# Patient Record
Sex: FEMALE | Race: WHITE
[De-identification: names, ages, dates, MRNs, and addresses within clinical notes are randomized per-mention and may not be internally consistent; named-entity substitution may affect disease eponyms.]

---

## 2023-01-06 ENCOUNTER — HOSPITAL ENCOUNTER (EMERGENCY)
Dept: HOSPITAL 97 - ER | Age: 43
Discharge: HOME | End: 2023-01-06
Payer: COMMERCIAL

## 2023-01-06 VITALS — TEMPERATURE: 97.8 F | DIASTOLIC BLOOD PRESSURE: 82 MMHG | OXYGEN SATURATION: 98 % | SYSTOLIC BLOOD PRESSURE: 132 MMHG

## 2023-01-06 DIAGNOSIS — Z88.0: ICD-10-CM

## 2023-01-06 DIAGNOSIS — F43.22: Primary | ICD-10-CM

## 2023-01-06 DIAGNOSIS — D64.9: ICD-10-CM

## 2023-01-06 DIAGNOSIS — I10: ICD-10-CM

## 2023-01-06 DIAGNOSIS — Z88.5: ICD-10-CM

## 2023-01-06 DIAGNOSIS — F17.210: ICD-10-CM

## 2023-01-06 LAB
BUN BLD-MCNC: 8 MG/DL (ref 7–18)
GLUCOSE SERPLBLD-MCNC: 88 MG/DL (ref 74–106)
HCT VFR BLD CALC: 35 % (ref 36–45)
LYMPHOCYTES # SPEC AUTO: 3.6 K/UL (ref 0.7–4.9)
MCV RBC: 73 FL (ref 80–100)
PMV BLD: 8.8 FL (ref 7.6–11.3)
POTASSIUM SERPL-SCNC: 4.3 MMOL/L (ref 3.5–5.1)
RBC # BLD: 4.79 M/UL (ref 3.86–4.86)
TROPONIN I SERPL HS-MCNC: 3.3 PG/ML (ref ?–58.9)

## 2023-01-06 PROCEDURE — 99285 EMERGENCY DEPT VISIT HI MDM: CPT

## 2023-01-06 PROCEDURE — 85025 COMPLETE CBC W/AUTO DIFF WBC: CPT

## 2023-01-06 PROCEDURE — 80048 BASIC METABOLIC PNL TOTAL CA: CPT

## 2023-01-06 PROCEDURE — 71045 X-RAY EXAM CHEST 1 VIEW: CPT

## 2023-01-06 PROCEDURE — 84484 ASSAY OF TROPONIN QUANT: CPT

## 2023-01-06 PROCEDURE — 93005 ELECTROCARDIOGRAM TRACING: CPT

## 2023-01-06 PROCEDURE — 36415 COLL VENOUS BLD VENIPUNCTURE: CPT

## 2023-01-06 NOTE — EDPHYS
Physician Documentation                                                                           

 [facility]                                                                 

Name: Shemar Dominguez                                                                              

Age: 42 yrs                                                                                       

Sex: Female                                                                                       

: 1980                                                                                   

MRN: [id number]                                                                                   

Arrival Date: 2023                                                                          

Time: 13:52                                                                                       

Account#: [id number]                                                                            

Bed 16                                                                                            

Private MD:                                                                                       

ED Physician David Thomas                                                                         

HPI:                                                                                              

                                                                                             

14:59 This 42 yrs old Female presents to ER via Ambulatory with complaints of Chest Pain, Arm ms3 

      Pain, Weakness.                                                                             

14:59 42-year-old female with past medical history of hypertension, hypercholesterolemia,     ms3 

      anxiety presents for chest pain and left arm pain that began at 2 AM. Patient states        

      she is going through divorce at this time and is under significant stress. Patient          

      states the discomfort is severe and described as being tight. Patient denies                

      alleviating or inciting factors. Patient was instructed to take a Klonopin at 3 AM.         

      Patient endorses nausea. Patient denies vomiting or shortness of breath.                    

                                                                                                  

OB/GYN:                                                                                           

14:23 LMP 2022                                                                          ap3 

                                                                                                  

Historical:                                                                                       

- Allergies:                                                                                      

14:20 Codeine;                                                                                ap3 

14:20 PENICILLINS;                                                                            ap3 

- PMHx:                                                                                           

14:20 Hypertensive disorder; Hypercholesterolemia; Anxiety;                                   ap3 

                                                                                                  

- Immunization history:: Client reports receiving the 2nd dose of the Covid vaccine,              

  Flu vaccine is not up to date.                                                                  

- Social history:: Smoking status: Patient reports the use of cigarette tobacco                   

  products, smokes more than three packs cigarettes per day.                                      

                                                                                                  

                                                                                                  

ROS:                                                                                              

14:59 Constitutional: Negative for fever, and chills. Neck: Negative for injury, pain, and    ms3 

      swelling, Respiratory: Negative for shortness of breath, cough, wheezing, and pleuritic     

      chest pain.                                                                                 

14:59 Skin: Negative for injury, rash, and discoloration.                                         

14:59 Cardiovascular: Positive for chest pain.                                                    

14:59 Abdomen/GI: Positive for nausea, Negative for abdominal pain, vomiting.                     

                                                                                                  

Exam:                                                                                             

14:33 ECG was reviewed by the Attending Physician.                                            ms3 

14:59 Constitutional:  This is a well developed, well nourished patient who is awake, alert,  ms3 

      and in no acute distress. Head/Face:  Normocephalic, atraumatic. Chest/axilla:  Normal      

      chest wall appearance and motion.  Nontender with no deformity.   Cardiovascular:           

      Regular rate and rhythm with a normal S1 and S2.  No gallops, murmurs, or rubs.  Normal     

      PMI, no JVD.  No pulse deficits. Respiratory:  Lungs have equal breath sounds               

      bilaterally, clear to auscultation and percussion.  No rales, rhonchi or wheezes noted.     

       No increased work of breathing, no retractions or nasal flaring. Abdomen/GI:  Soft,        

      non-tender, with normal bowel sounds.  No distension or tympany.  No guarding or            

      rebound.  No evidence of tenderness throughout. Skin:  Warm, dry with normal turgor.        

      Normal color with no rashes, no lesions, and no evidence of cellulitis. MS/ Extremity:      

      Pulses equal, no cyanosis.  Neurovascular intact.  Full, normal range of motion.            

                                                                                                  

Vital Signs:                                                                                      

14:16  / 82; Pulse 71; Resp 17; Temp 97.8; Pulse Ox 98% ; Weight 99.79 kg; Height 5 ft. ap3 

      7 in. (170.18 cm);                                                                          

14:16 Body Mass Index 34.46 (99.79 kg, 170.18 cm)                                             ap3 

                                                                                                  

MDM:                                                                                              

14:58 Patient medically screened.                                                             ms3 

14:59 Differential diagnosis: abnormal EKG, acute myocardial infarction, chest wall pain,     ms3 

      pulmonary embolus. ED course: PERC negative therefore ruling out pulmonary embolism.        

16:25 HEART Score: History: Slightly Suspicious (0), ECG: Normal (0), Age: < or = 45 years    ms3 

      (0), Risk Factors: 1 or 2 risk factors (1), Troponin: < or = 1 x Normal Limit (0),          

      Total Score = 1. Data reviewed: vital signs, nurses notes, lab test result(s), EKG,         

      radiologic studies, and as a result, I will discharge patient. Counseling: I had a          

      detailed discussion with the patient and/or guardian regarding: the historical points,      

      exam findings, and any diagnostic results supporting the discharge/admit diagnosis, lab     

      results, radiology results, the need for outpatient follow up, to return to the             

      emergency department if symptoms worsen or persist or if there are any questions or         

      concerns that arise at home. Special discussion: Based on the patient's history, exam,      

      and Dx evaluation, there is no indication for emergent intervention or inpatient Tx. It     

      is understood by the patient/guardian that if the Sx's persist or worsen they need to       

      return immediately for re-evaluation. I discussed with the patient/guardian in detail       

      that at this point there is no indication for admission to the hospital. It is              

      understood, however, that if the symptoms persist or worsen the patient needs to return     

      immediately for re-evaluation. ED course: Consideration of hospitalization, however         

      patient's heart score 1. Independent interpretations: Cardiac monitor normal sinus          

      rhythm, heart rate 71 Prescriptions considered but not given: Discussed                     

      over-the-counter Tylenol and ibuprofen History obtained from patient and her                

      significant other Chronic illnesses impacting care: Hypertension and                        

      hypercholesterolemia influenza heart score. .                                               

                                                                                                  

                                                                                             

14:11 Order name: Basic Metabolic Panel; Complete Time: 15:04                                 ms3 

                                                                                             

14:11 Order name: CBC with Diff; Complete Time: 15:04                                         ms3 

                                                                                             

14:11 Order name: Troponin HS; Complete Time: 15:04                                           ms3 

                                                                                             

14:11 Order name: XRAY Chest (1 view); Complete Time: 15:04                                   ms3 

                                                                                             

14:11 Order name: EKG; Complete Time: 14:12                                                   ms3 

                                                                                             

14:11 Order name: Cardiac monitoring; Complete Time: 14:32                                    ms3 

                                                                                             

14:11 Order name: EKG - Nurse/Tech; Complete Time: 14:32                                      ms3 

                                                                                             

14:11 Order name: IV Saline Lock; Complete Time: 14:32                                        ms3 

                                                                                             

14:11 Order name: Labs collected and sent; Complete Time: 14:32                               ms3 

                                                                                             

14:11 Order name: O2 Per Protocol; Complete Time: 14:25                                       ms3 

                                                                                             

14:11 Order name: O2 Sat Monitoring; Complete Time: 14:25                                     ms3 

                                                                                                  

EC:33 Rate is 66 beats/min. Rhythm is regular. QRS Axis is Normal. DE interval is normal. QRS ms3 

      interval is normal. Clinical impression: Normal ECG. Interpreted by me. Reviewed by me.     

                                                                                                  

Administered Medications:                                                                         

No medications were administered                                                                  

                                                                                                  

                                                                                                  

Disposition Summary:                                                                              

23 15:17                                                                                    

Discharge Ordered                                                                                 

      Location: Home                                                                          ms3 

      Condition: Stable                                                                       ms3 

      Diagnosis                                                                                   

        - Chest pain, unspecified                                                             ms3 

        - Adjustment disorder with anxiety                                                    ms3 

        - Anemia, unspecified                                                                 ms3 

      Followup:                                                                               ms3 

        - With: Seymour Romero DO                                                                  

        - When: 1 - 2 days                                                                         

        - Reason: Recheck today's complaints                                                       

      Discharge Instructions:                                                                     

        - Discharge Summary Sheet                                                             ms3 

        - Anemia                                                                              ms3 

        - Nonspecific Chest Pain, Adult                                                       ms3 

      Forms:                                                                                      

        - Medication Reconciliation Form                                                      ms3 

        - Thank You Letter                                                                    ms3 

        - Antibiotic Education                                                                ms3 

        - Prescription Opioid Use                                                             ms3 

Signatures:                                                                                       

Dispatcher [employer]                           Deysi Broussard RN                    RN   ap3                                                  

David Thomas DO DO   ms3                                                  

                                                                                                  

Corrections: (The following items were deleted from the chart)                                    

15: 14:59 Differential diagnosis: abnormal EKG, acute myocardial infarction, chest wall     ms3 

      pain, pulmonary embolus, ms3                                                                

15:03 14:59 ED course: PERC negative.. ms3                                                    ms3 

                                                                                                  

**************************************************************************************************

## 2023-01-06 NOTE — ER
Nurse's Notes                                                                                     

 [facility]                                                                 

Name: Shemar Dominguez                                                                              

Age: 42 yrs                                                                                       

Sex: Female                                                                                       

: 1980                                                                                   

MRN: [id number]                                                                                   

Arrival Date: 2023                                                                          

Time: 13:52                                                                                       

Account#: [id number]                                                                            

Bed 16                                                                                            

Private MD:                                                                                       

Diagnosis: Chest pain, unspecified;Adjustment disorder with anxiety;Anemia, unspecified           

                                                                                                  

Presentation:                                                                                     

                                                                                             

14:16 Chief complaint: Patient states: she started having chest pain yesterday and she called ap3 

      her dr friend and he told her to take her friends Klonopin and that she could be having     

      an MI or an anxiety attack. patient states she took the extra Klonopin and went to          

      sleep. Patient is worried she had an MI as her chest pain radiated into her left arm.       

      Patient also complains of a headache for the last 6 days and she has not gotten any         

      relief. Coronavirus screen: At this time, the client does not indicate any symptoms         

      associated with coronavirus-19. Ebola Screen: No symptoms or risks identified at this       

      time. Initial Sepsis Screen: Does the patient meet any 2 criteria? No. Patient's            

      initial sepsis screen is negative. Does the patient have a suspected source of              

      infection? No. Patient's initial sepsis screen is negative. Risk Assessment: Do you         

      want to hurt yourself or someone else? Patient reports no desire to harm self or            

      others. Onset of symptoms was 2023.                                             

14:16 Method Of Arrival: Ambulatory                                                           ap3 

14:16 Acuity: MARIA L 3                                                                           ap3 

                                                                                                  

Triage Assessment:                                                                                

14:22 General: Appears in no apparent distress. Behavior is calm. Pain: Complains of pain in  ap3 

      chest. Neuro: Level of Consciousness is awake, alert, obeys commands, Oriented to           

      person, place, time, situation. Cardiovascular: Reports chest pain. Respiratory: Airway     

      is patent Respiratory effort is even, unlabored, Respiratory pattern is regular,            

      symmetrical.                                                                                

                                                                                                  

OB/GYN:                                                                                           

14:23 LMP 2022                                                                          ap3 

                                                                                                  

Historical:                                                                                       

- Allergies:                                                                                      

14:20 Codeine;                                                                                ap3 

14:20 PENICILLINS;                                                                            ap3 

- PMHx:                                                                                           

14:20 Hypertensive disorder; Hypercholesterolemia; Anxiety;                                   ap3 

                                                                                                  

- Immunization history:: Client reports receiving the 2nd dose of the Covid vaccine,              

  Flu vaccine is not up to date.                                                                  

- Social history:: Smoking status: Patient reports the use of cigarette tobacco                   

  products, smokes more than three packs cigarettes per day.                                      

                                                                                                  

                                                                                                  

Screenin:23 [facility] ED Fall Risk Assessment (Adult) History of falling in the last 3 months,       ap3 

      including since admission No falls in past 3 months (0 pts). Abuse screen: Denies           

      threats or abuse. Nutritional screening: No deficits noted. Tuberculosis screening: No      

      symptoms or risk factors identified.                                                        

                                                                                                  

Assessment:                                                                                       

14:24 Pain: Pain radiates to left arm Pain began gradually, 1 day ago.                        ap3 

                                                                                                  

Vital Signs:                                                                                      

14:16  / 82; Pulse 71; Resp 17; Temp 97.8; Pulse Ox 98% ; Weight 99.79 kg; Height 5 ft. ap3 

      7 in. (170.18 cm);                                                                          

14:16 Body Mass Index 34.46 (99.79 kg, 170.18 cm)                                             ap3 

                                                                                                  

ED Course:                                                                                        

13:52 Patient arrived in ED.                                                                  as  

14:11 David Thomas DO is Attending Physician.                                                ms3 

14:12 Akhil Archer, RN is Primary Nurse.                                                    bp  

14:20 Triage completed.                                                                       ap3 

14:23 Arm band placed on right wrist.                                                         ap3 

14:23 Patient maintains SpO2 saturation greater than 95% on room air.                         ap3 

14:24 Patient has correct armband on for positive identification. Placed in gown. Call light  ap3 

      in reach. Side rails up X 1. Adult w/ patient. Cardiac monitor on. Pulse ox on. NIBP        

      on. Door closed. Noise minimized.                                                           

14:30 Inserted saline lock: 20 gauge in left forearm, using aseptic technique. Blood          bp  

      collected.                                                                                  

14:39 EKG done, by ED staff.                                                                  tm3 

14:54 XRAY Chest (1 view) In Process Unspecified.                                             EDMS

15:16 Seymour Romero DO is Referral Physician.                                                ms3 

15:31 No provider procedures requiring assistance completed. IV discontinued, intact,         ap3 

      bleeding controlled, No redness/swelling at site. Pressure dressing applied.                

                                                                                                  

Administered Medications:                                                                         

No medications were administered                                                                  

                                                                                                  

                                                                                                  

Outcome:                                                                                          

15:17 Discharge ordered by MD.                                                                ms3 

15:31 Discharged to home ambulatory.                                                          ap3 

15:31 Condition: good                                                                             

15:31 Discharge instructions given to patient, Instructed on discharge instructions, follow       

      up and referral plans. Demonstrated understanding of instructions, follow-up care.          

15:32 Patient left the ED.                                                                    ap3 

                                                                                                  

Signatures:                                                                                       

Dispatcher [employer]                           EDMS                                                 

Willi Mora                                tm3                                                  

Kaylyn Meyer Brian, RN                      RN   bp                                                   

Deysi Pressley RN                    RN   ap3                                                  

David Thomas DO DO   ms3                                                  

                                                                                                  

**************************************************************************************************

## 2023-01-06 NOTE — RAD REPORT
EXAM DESCRIPTION:  RAD - Chest Single View - 1/6/2023 2:53 pm

 

CLINICAL HISTORY:  CHEST PAIN

 

TECHNIQUE:  AP portable chest image was obtained 1/6/2023 2:53 pm .

 

FINDINGS:  No suspicious infiltrate or mass. Linear atelectasis seen in the mid right lung field. No 
failure or volume overload. Heart and vasculature are normal. No measurable pleural effusion and no p
neumothorax. No acute bony abnormality seen. No acute aortic findings suspected.

 

IMPRESSION:  No acute cardiopulmonary process.

## 2023-01-06 NOTE — XMS REPORT
Continuity of Care Document

                           Created on:2023



Patient:FAMILIA WILSON

Sex:Female

:1980

External Reference #:[id number]





Demographics







                          Address                   [address] [address]

 

                          Home Phone                [phone]

 

                          Work Phone                [phone]

 

                          Mobile Phone              [phone]

 

                          Email Address             [email or web address]

 

                          Preferred Language        English

 

                          Marital Status            Unknown

 

                          Worship Affiliation     Unknown

 

                          Race                      Unknown

 

                          Additional Race(s)        White

 

                          Ethnic Group              Unknown









Author







                          Organization              [facility]

t

 

                          Address                   [address]



                                                    [place] [address]

 

                          Phone                     [phone]









Support







                Name            Relationship    Address         Phone

 

                ELDA WILSON Spouse          [address] [facility] [phone]



                                                [place] [id number] 

 

                FAMILIA WILSON               .               [phone]



                                                .               

 

                AGUSTIN WILSON SPOU            [address] [facility] [phone]



                                                .               



                                                [place] [id number] 

 

                Steve, NADYA Spouse          [address] [facility] [address] [phone]



                                                [place] [id number] 

 

                JOSE NADYA Unavailable     [address] [place] [phone]



                                                [place] [id number] 

 

                ARGELIAR, ELDA Unavailable     [address] [phone]



                                                [place] [id number] 

 

                ARGELIAR, ELDA SP              [address] [facility] [address] [phone]



                                                [place] [address] 

 

                STEVE, ELDA SP              [address] [facility] [phone]



                                                [address] 



                                                [place] [id number] 

 

                Arsenio Nurmo V               [address] [facility] [phone]



                                                [place], [place] [id number] 

 

                None, Legal Guardian O               Unavailable     Unavailable

 

                Steve, Elda Spouse          [address] [phone]



                                                [place] [phone] 

 

                Delicia Bauer   Mother          Unavailable     [phone]

 

                NADYA WILSON SPOU            [address] [facility] [phone]



                                                [place] [id number] 









Care Team Providers







                    Name                Role                Phone

 

                    Asked, No Pcp       Primary Care Physician Unavailable

 

                    layne         Attending Clinician Unavailable

 

                    Julia Hobbs         Attending Clinician Unavailable

 

                    Berlin         Attending Clinician Unavailable

 

                    Sherwin Nur MD Attending Clinician [phone]

 

                    DELBERT MITCHELL      Attending Clinician Unavailable

 

                    Alta Chan    Attending Clinician Unavailable

 

                    Ammy Duke        Attending Clinician Unavailable

 

                    ESTUARDO JACOBO        Attending Clinician Unavailable

 

                    CHELSEA JOHNSON Attending Clinician Unavailable

 

                    PADILLA PLATA Attending Clinician Unavailable

 

                    Emilee Eubanks         Attending Clinician Unavailable

 

                    [email or web address]        Attending Clinician Unavailable

 

                    KIMBERLY LEWIS Attending Clinician Unavailable

 

                    Vanessa Mg Attending Clinician Unavailable

 

                    RONAK MCMULLEN Attending Clinician Unavailable

 

                    JACQUES HEREDIA Attending Clinician Unavailable

 

                    JOHN GUTIÉRREZ  Attending Clinician Unavailable

 

                    VALERI MEYER Attending Clinician Unavailable

 

                    JUVENAL ANDRADE M.D. Attending Clinician Unavailable

 

                    Edil Martinez   Attending Clinician Unavailable

 

                    HIEN KHAN Attending Clinician Unavailable

 

                    YASIR NOLEN       Attending Clinician Unavailable

 

                    LUIS MANUEL COFFMAN Attending Clinician Unavailable

 

                    GAIL IBARRA Attending Clinician Unavailable

 

                    CLAY NAYLOR       Attending Clinician Unavailable

 

                    TONI BERRY Attending Clinician Unavailable

 

                    WICHO AGRAWAL   Attending Clinician Unavailable

 

                    LISA MARIE Attending Clinician Unavailable

 

                    ROGER SOTOMAYOR Attending Clinician Unavailable

 

                    MIGUEL ANGEL CASTRO   Attending Clinician Unavailable

 

                    JENIFFER BOWEN M.D. Attending Clinician Unavailable

 

                    Physician, No Primary or Family Admitting Clinician UnavailAdi Iraheta  Admitting Clinician Unavailable

 

                    Cassidy_TERRY         Admitting Clinician Unavailable

 

                    Charla Page        Admitting Clinician Unavailable

 

                    [email or web address]        Admitting Clinician Unavailable

 

                    KNOW, DOES_NOT      Admitting Clinician Unavailable

 

                    DANIEL SANCHEZ Admitting Clinician Unavailable

 

                    Alta Chan MD Unavailable         Unavailable

 

                    Yang MSN, FNP, Marylin Unavailable         [phone]

 

                    Sherwin Nur MD Unavailable         [phone]









Payers







           Payer Name Policy Type Policy Number Effective Date Expiration Date S

noe

 

           [employer]            [id number]  2014 



           PLUS                             00:00:00   00:00:00   

 

           [employer] P          [id number]  2022            



                                            00:00:00              

 

           [facility]            [id number] 2022            



           [place]                       00:00:00              



           - DUAL ELIGIBLE                                             



           (MEDICARE                                              



           REPLACEMENT/ADVANT                                             



           AGE - HMO)                                             

 

           [facility]            [id number] 2022            



           COMMUNITY PLAN -                       00:00:00              



           [place]                                             



           (MEDICAID HMO)                                             

 

           [employer]            [id number]  2020            



           HMO                              00:00:00              

 

           MEDICAID              [id number]  [id number]            



           [employer]                       00:00:00              







Problems







       Condition Condition Condition Status Onset  Resolution Last   Treating Co

mments 

Source



       Name   Details Category        Date   Date   Treatment Clinician        



                                                 Date                 

 

       Foot joint        Condition Active 2022 Chan,    

    [facility]



       pain, left                                11:42:36 Alta        Adul

t



                                   00:00:                             Medicin



                                   00                                 e

 

       Other         Condition Active 2022 AYAZ Chan



       fracture                                11:42:36 Alta        Adult



       of left                      00:00:                             Medicin



       foot,                       00                                 e



       initial                                                         



       encounter                                                         



       for closed                                                         



       fracture                                                         

 

       Fatigue        Condition Active 2022 AYAZ Chan



       and                                   11:42:36 Alta        Adult



       malaise                      00:00:                             Medicin



                                   00                                 e

 

       Exercise        Condition Active 2022 Rocio      

  [facility]



       Counseling                                11:42:36 Alta        Adul

t



                                   00:00:                             Medicin



                                   00                                 e

 

       Dietary        Condition Active 2022 Rocio       

 [facility]



       counseling                                11:42:36 Alta        Adul

t



       and                         00:00:                             Medicin



       surveillan                      00                                 e



       ce                                                             

 

       BMI           Condition Active 2022 AYAZ Chan



       37.0-37.9                                11:42:36 Alta        Adult



                                   00:00:                             Medicin



                                   00                                 e

 

       Essential        Condition Active 2022 EMIGDIO Soria



       hypertensi                                09:17:39 Marylin        Adul

t



       on                          00:00:                             Medicin



                                   00                                 e

 

       Disorder,        Condition Active 2022 EMIGDIO Soria



       endocrine                                09:17:39 Marylin        Adult



       NOS                         00:00:                             Medicin



                                   00                                 e

 

       Hyperlipid        Condition Active 2022 EMIGDIO Soria



       emia                                  09:17:38 Marylin        Adult



                                   00:00:                             Medicin



                                   00                                 e

 

       Obesity        Condition Active 2022 AYAZ Soria

C



                                             09:17:38 Marylin        Adult



                                   00:00:                             Medicin



                                   00                                 e

 

       DEPRESSIVE        Condition Active 2022 Leoz-Whit   

     [facility]



       DISORDER,                      6-14          09:11:54 zo,           Adult



       MAJOR,                      00:00:               Sherwin        Medicin



       RECURRENT                      00                                 e



       EPISODE,                                                         



       MODERATE                                                         

 

       SCHIZOAFFE        Condition Active 2022 Leoz-Whit   

     [facility]



       CTIVE                                 09:11:54 zo,           Adult



       DISORDER,                      00:00:               Sherwin        Medi

katie



       DEPRESSIVE                      00                                 e



       TYPE                                                           

 

       Tobacco        Condition Active 2022 Leoz-Whit      

  [facility]



       user                                  09:11:54 zo,           Adult



                                   00:00:               Sherwin        Medicin



                                   00                                 e

 

       Abdominal Abdominal Disease Active                              Met

hodi



       pain   pain                 



                                   00:00:                             Hospita



                                   00                                 l

 

       Nausea Nausea Disease Active                              Methodi



                                                                  st



                                   00:00:                             Hospita



                                   00                                 l

 

       Schizoaffe Schizoaffe Disease Active                              M

ethodi



       ctive  ctive                                               st



       disorder disorder               00:00:                             Hospit

a



                                   00                                 l

 

       History of History of Problem Resolve                                    

[place]



       Chronic Chronic        d                                         Physici



       bronchioli bronchioli                                                  an

s



       tis    tis                                                     

 

       History of History of Problem Resolve                                    

[place]



       Depression Depression        d                                         Ph

ysici



       with   with                                                    ans



       anxiety anxiety                                                  

 

       History of History of Problem Resolve                                    

UT



       gastroesop gastroesop        d                                         Ph

ysici



       hageal hageal                                                  ans



       reflux reflux                                                  



       (GERD) (GERD)                                                  

 

       History of History of Problem Resolve                                    

[place]



       high   high          d                                         Physici



       cholestero cholestero                                                  an

s



       l      l                                                       

 

       History of History of Problem Resolve                                    

UT



       hypertensi hypertensi        d                                         Ph

ysici



       on     on                                                      ans

 

       History of History of Problem Resolve                                    

UT



       Migraines Migraines        d                                         Phys

ici



                                                                      ans

 

       History of History of Problem Resolve                                    

UT



       sleep  sleep         d                                         Physici



       apnea  apnea                                                   ans

 

       History of History of Problem Resolve                                    

UT



       panic  panic         d                                         Physici



       attacks attacks                                                  ans

 

       Hospital Hospital Problem Active                                    UT



       discharge discharge                                                  Phys

ici



       follow-up follow-up                                                  ans







Allergies, Adverse Reactions, Alerts







       Allergy Allergy Status Severity Reaction(s) Onset  Inactive Treating Comm

ents 

Source



       Name   Type                        Date   Date   Clinician        

 

       Penicill DA     Active U      UNKNOWN 2022                      [facility]



       ins                                                        [place]



                                          00:00:                      Health



                                          00                          are



                                                                      [facility]

 

       codeine DA     Active U      UNKNOWN -                      [facility]



                                          1-21                        [place]



                                          00:00:                      [facility]



                                          00                          are



                                                                      [facility]

 

       BACLOFEN Drug   Active High          -                      [facility]



              allergy        Criticali        8-19                        Adult



              (disorde        ty            00:00:                      Medicin



              r)                          00                          e

 

       CODEINE Drug   Active High          -                      [facility]



              allergy        Criticali        8-19                        Adult



              (disorde        ty            00:00:                      Medicin



              r)                          00                          e

 

       PENICILL Drug   Active High          -                      [facility]



       IN     allergy        Criticali        8-19                        Adult



              (disorde        ty            00:00:                      Medicin



              r)                          00                          e

 

       Penicill DA     Active U      UNKNOWN-HAPP                       HC

A



       ins                         ENED AS A                         [place]



                                   CHILD  00:00:                      [facility]



                                                                    [facility]

 

       codeine DA     Active SV     HYPERACTIVE,                       [facility]



                                   SPASTIC,BANG 6-                        Conr

oe



                                   ING HEAD ON 00:00:                      Regio

na



                                   [facility]                           [facility]

 

       Penicill DA     Active U      UNKNOWN-HAPP                       HC

A



       ins                         ENED AS A 3-17                        [place]



                                   CHILD  00:00:                      [facility]



                                                                    [facility]

 

       codeine DA     Active SV     HYPERACTIVE,                       [facility]



                                   SPASTIC,BANG 3-17                        Conr

oe



                                   ING HEAD ON 00:00:                      Regio

na



                                   [facility]                           [facility]

 

       Penicill DA     Active U             -                      [facility]



       ins                                3-17                        [facility]



                                          00:00:                      d



                                          00                          [facility]

 

       codeine DA     Active SV            -0                      [facility]



                                          3-17                        [facility]



                                          00:00:                      d



                                          00                          [facility]

 

       Baclofen Drug   Active        Other (See                Unsure, [facility]



              Intolera               Comments)                  bad    [facility]



              nce                         00:00:               "reaction Medical



                                          00                   "      Center

 

       Codeine Drug   Active        Other (See                hyperacti [facility]

I St



              Intolera               Comments)                  vity   [facility]



              nce                         00:00:                      Medical



                                          00                          Center

 

       Penicill Propensi Active        Other (See                Unknown, 

[facility]



       ins    ty to                Comments)                  child[facility]

s



              adverse                      00:00:               allergy Medical



              reaction                      00                          Center



              s                                                       

 

       Ondanset Drug   Active        Nausea Only                       [facility]

 St



       mary Hcl Intolera                                              [facility]



       (Pf)   nce                         00:00:                      Medical



                                          00                          Center

 

       CODEINE Allergy Active High   Other                        [facility]



                                                                  



                                          00:00:                      



                                          00                          

 

       PENICILL Allergy Active        Other                        [facility]



       INS                                                        



                                          00:00:                      



                                          00                          

 

       BACLOFEN Allergy Active        Other                        [facility]



                                                                  [facility]



                                          00:00:                      Medical



                                          00                          Center

 

       ONDANSET Allergy Active Low    Nausea                       [facility]



       [facility]                                                     



       (PF)                               00:00:                      



                                          00                          

 

       Penicill Propensi Active        Other (See                Unknown, 

[facility]



       ins    ty to                Comments)                  childhood [facility]

s



              adverse                      00:00:               allergy Medical



              reaction                      00                          Center



              s                                                       

 

       Penicill DA     Active U      ITCHING                       [facility]



       ins                                                        [place]



                                          00:00:                      d



                                          00                          Medical



                                                                      Center

 

       codeine DA     Active U      HYPER                        [facility]



                                                                  [place]



                                          00:00:                      d



                                          00                          [facility]



                                                                      Center

 

       [facility]     Active U                                   [facility]



       ins                                                        [place]



                                          00:00:                      d



                                          00                          Medical



                                                                      Center

 

       codeine DA     Active U                                   [facility]



                                                                  [place]



                                          00:00:                      d



                                          00                          Medical



                                                                      Center

 

       Pregabal Propensi Active        Other (See 2019               Very   Me

thodi



       in     ty to                Comments) 0-17                 irritable st



              adverse                      00:00:                      Hospita



              reaction                      00                          l



              s to                                                    



              drug                                                    

 

       Codeine Propensi Active                                     [facility]



              ty to                       8-12                        st



              adverse                      00:00:                      Hospita



              reaction                      00                          l



              s to                                                    



              drug                                                    

 

       [facility] Propensi Active                                     Method

i



       ins    ty to                       8-12                        st



              adverse                      00:00:                      Hospita



              reaction                      00                          l



              s to                                                    



              drug                                                    

 

       [facility]     Active U                                   [facility]



       ins                                                        [place]



                                          00:00:                      d



                                          00                          Medical



                                                                      Center

 

       codeine DA     Active SV                                  [facility]



                                          [address]                        [place]



                                          00:00:                      d



                                          00                          Medical



                                                                      Center

 

       codeine DA     Active SV     HYPERACTIVE,                       [facility]



                                   SPASTIC,BANG [address]                        [facility]

[place]



                                   ING HEAD ON 00:00:                      d



                                   THE [place] 00                          Medic

al



                                   A CHILD                             Center

 

       [facility] DA     Active U      UNKNOWN-HAPP                       [facility]

A



       ins                         ENED AS A [address]                        [place]



                                   CHILD  00:00:                      d



                                          00                          Medical



                                                                      Center

 

       [facility] Allergy Active                                           [facility]



       ins    to drug                                                  [facility]



              (finding                                                  ans



              )                                                       

 

       codeine Allergy Active                                           [facility]



              to drug                                                  [facility]



              (finding                                                  ans



              )                                                       







Social History







           Social Habit Start Date Stop Date  Quantity   Comments   Source

 

           History SDOH Alcohol                                             [facility]



           [facility] Drinks                                             [facility]

 

           History SDOH Alcohol                                             [facility]



           Binge                                                  [facility]

 

           History SDOH Alcohol                                             [facility]



           Comment                                                [facility]

 

           History of tobacco                       Cigarette Smoker            

[facility]



           use                                                    [facility] Center

 

           time of call 2022/2022 1:23            [facility]



                      13:23:36   13:23:36   PM                    Health

 

           PHQ2 Questionairre 2022                       [facility]



           Score      11:15:26   11:15:26                         Health

 

           if the patient is 2022 No                    [facility]



           using/has used a 11:15:26   11:15:26                         Health



           vaping item,                                             



           Current, Former,                                             



           Never Used, Not                                             



           asked                                                  

 

           smoking, advice to 2022 Yes                   [facility]



           quit       11:15:26   11:15:26                         Health

 

           drug [facility]   2022 [facility]             [facility]

ty



                      11:15:26   11:15:26                         Health

 

           alcohol use 2022 Never                 [facility]

ity



                      11:15:26   11:15:26                         Health

 

           tobacco use 2022 Currently             [facility]

it



           (cigarettes, cigar, 11:15:26   11:15:26                         Healt

h



           chew, pipe)                                             

 

           social history 2022 reviewed today            [facility]



           reviewed E&M 11:15:26   11:15:26                         Health

 

           social history E&M 2022  since            [facility]



                      11:15:26   11:15:26   . Been            Health



                                            together since            



                                             and             



                                            in 2016. Close to            



                                            both bio parents.            



                                            Parents are            



                                            . Half            



                                            brother and full            



                                            sister. "somewhat            



                                            close to both".            



                                            Reports having a            



                                            few friends. No            



                                            children. " I            



                                            would like to",            



                                            reports she lost            



                                            twins, they were            



                                            born after 5            



                                            months of             



                                            pregnancy and            



                                            could not             



                                            survive, this was            



                                            in . Born in            



                                            [place]. Lives in            



                                            [place] with            



                                            partner and            



                                            partner's mother.            



                                            Employment            



                                            status: No.            



                                            Occupation title:            



                                            unemployed.            



                                            Highest education            



                                            level: some            



                                            college.              



                                            Unemployed now,            



                                            havent work in            



                                            the past 15            



                                            years. On             



                                            disability $800            



                                            monthly. Some            



                                            collegeSex at            



                                            birth: Female.            

 

           sexual orientation 2022 Don?t know            [facility]



                      11:15:26   11:15:26                         Health

 

           is there any chance 2022 No                    [facility]

[facility]



           that you could be 11:15:26   11:15:26                         Health



           pregnant?                                              

 

           albumin, serum 2022 4.3 g/dL              [facility]

munity



                      00:00:00   00:00:00                         Health

 

           family support 2022 Been together            [facility]



                      16:31:33   16:31:33   since  and            Health



                                             in 2016.            



                                            Close to both bio            



                                            parents. Parents            



                                            are .            



                                            Half brother and            



                                            full sister.            



                                            "somewhat close            



                                            to both". Reports            



                                            having a few            



                                            friends. No            



                                            children. " I            



                                            would like to",            



                                            reports she lost            



                                            twins, they were            



                                            born after 5            



                                            months of             



                                            pregnancy and            



                                            could not             



                                            survive, this was            



                                            in .              

 

           Occupation #1 2022 unemployed            [facility]

unity



                      13:01:14   13:01:14                         Health

 

           current cigarette 2022                       [facility]



           packs per day 13:01:14   13:01:14                         Health

 

           cigarettes, number 2022                       [facility]

 [facility]



           smoked per day 13:01:14   13:01:14                         Health

 

           home/family 2022 [facility] [place]            [facility]

[facility]



           situation, 13:01:14   13:01:14   with partner and            Health



           assessment                       partner's mother.            

 

           Tobacco use and 2020 Never used            [facility]

[facility]



           exposure   00:00:00   00:00:00                         [facility] Center

 

           History SDOH Alcohol 2020 1                     [facility]



           Frequency  00:00:00   00:00:00                         [facility] Center

 

           Alcohol intake 2019-10-17 2019-10-17 Ex-drinker            [facility]



                      00:00:00   00:00:00   (finding)             Hospital

 

           Cigarettes smoked 2019                       [facility]

st



           current (pack per 00:00:00   00:00:00                         Hospita

l



           day) - Reported                                             

 

           Cigarette pack-years 2019                       [facility]

[facility]



                      00:00:00   00:00:00                         Hospital

 

           Sex Assigned At 1980                       [facility]



           Birth      00:00:00   00:00:00                         Hospital









                Smoking Status  Start Date      Stop Date       Source

 

                Smokes tobacco daily (finding) 2022 11:15:26              

   [facility]







Medications







       Ordered Filled Start  Stop   Current Ordering Indication Dosage Frequency

 Signature

                    Comments            Components          Source



     Medication Medication Date Date Medication? Clinician                (SIG) 

          



     Name Name                                                   

 

     (TRAZODONE            Yes  Sherwin                Take 1-2         

  [facility]



     HCL) 50 MG      8-22           Leoz-Whit                tablet by         

  [facility]



     TABS      00:00:           zo MD                mouth           ral



               00                                 every           Health



                                                  night as           



                                                  needed for           



                                                  insomnia           

 

     (MELOXICAM)            Yes  Alta      1         Take 1           LM

C



     7.5 MG TABS      8-19           Chan                tablet by          

 [facility]



               00:00:           MD                  mouth           ral



               00                                 twice a           Health



                                                  day as           



                                                  needed for           



                                                  pain with           



                                                  food           

 

     (TOPIRAMATE            Yes                      TAKE 1           [facility]



     ) 25 MG      8-17                               CAPSULE BY           Behavi

o



     CPSP      16:08:                               MOUTH           ral



               38                                 EVERY DAY           Health

 

     (CLONIDINE            Yes            1         Take 1           [facility]



     HCL) 0.1 MG      8-17                               tablet by           Beh

avio



     TABS      16:08:                               mouth           ral



               38                                 twice a           Health



                                                  day as           



                                                  needed for           



                                                  anxiety           

 

     (HALOPERIDO            Yes                      TAKE 1           [facility]



     L) 10 MG      8-17                               TABLET BY           Behavi

o



     TABS      16:08:                               MOUTH AT           ral



               38                                 BEDTIME           Health

 

     VENLAFAXINE            Yes                      TAKE 1           [facility]



     HCL ER      8-17                               CAPSULE BY           [facility]io



     (VENLAFAXIN      16:08:                               MOUTH           ral



     E HCL) 75      38                                 EVERY DAY           Healt

h



     MG                                                          



     [id number]                                                        

 

     (BUSPIRONE            Yes  Sherwin      1         Take 1           

[facility]



     HCL) 10 MG      7-28           Leoz-Whit                tablet by         

  Behavio



     TABS      08:07:           zo MD                mouth four           ral



               41                                 times a           Health



                                                  day            

 

     pantoprazol            Yes            20mg QD   Take 20 mg           

[facility]



     e         5-24                               by mouth           st



     (PROTONIX)      23:53:                               daily.           Hospi

ta



     20 MG EC      52                                                l



     tablet                                                        

 

     lithium 300            Yes            300mg Q.[id number] Take 300      

     [facility]



     MG capsule      5-24                          [id number] mg by           st



               23:53:                          3D   mouth 3           Hospita



               52                                 (three)           l



                                                  times a           



                                                  day with           



                                                  meals.           

 

     DULoxetine            Yes            60mg QD   Take 60 mg           M

ethodi



     (CYMBALTA)      5-24                               by mouth           st



     60 MG      23:53:                               daily.           Hospita



     capsule      52                                                l

 

     citalopram      2022-0      Yes            10mg QD   Take 10 mg           M

ethodi



     (CeleXA) 10      5-24                               by mouth           st



     MG tablet      23:53:                               daily.           Hospit

a



               52                                 Unsure of           l



                                                  dose           

 

     benztropine      2022-0      Yes            1mg  Q.[id number] Take 1 mg      

     [facility]



     (COGENTIN)      5-24                          [id number] by mouth 3        

   st



     1 MG tablet      23:53:                          3D   (three)           Hos

porter



               52                                 times a           l



                                                  day.           

 

     fluPHENAZin      2022-0      Yes            1mg  QD   Take 1 mg           M

ethodi



     e         5-24                               by mouth           st



     (PROLIXIN)      23:53:                               daily.           Hospi

ta



     1 MG tablet      52                                 Unsure of           l



                                                  dose           

 

     propranolol      2022-0      Yes            60mg Q.[id number] Take 60 mg     

      [facility]



     (INDERAL)      5-24                          [id number] by mouth 3         

  st



     60 MG      23:53:                          3D   (three)           Hospita



     tablet      52                                 times a           l



                                                  day.           

 

     pantoprazol      2022-0      Yes            20mg QD   Take 20 mg           

[facility]



     e         5-24                               by mouth           st



     (PROTONIX)      23:53:                               daily.           Hospi

ta



     20 MG EC      52                                                l



     tablet                                                        

 

     dicyclomine      -0      Yes            20mg Q.25D Take 20 mg          

 Method[facility]



     (BENTYL) 20      5-24                               by mouth 4           st



     mg tablet      23:53:                               (four)           Hospit

a



               52                                 times a           l



                                                  day.           

 

     lithium 300      2-0      Yes            300mg Q.[id number] Take 300      

     [facility]



     MG capsule      5-24                          [id number] mg by           st



               23:53:                          3D   mouth 3           Hospita



               52                                 (three)           l



                                                  times a           



                                                  day with           



                                                  meals.           

 

     DULoxetine      2022-0      Yes            60mg QD   Take 60 mg           M

ethodi



     (CYMBALTA)      5-24                               by mouth           st



     60 MG      23:53:                               daily.           Hospita



     capsule      52                                                l

 

     citalopram      2-0      Yes            10mg QD   Take 10 mg           M

ethodi



     (CeleXA) 10      5-24                               by mouth           st



     MG tablet      23:53:                               daily.           Hospit

a



               52                                 Unsure of           l



                                                  dose           

 

     benztropine      2022-0      Yes            1mg  Q.[id number] Take 1 mg      

     Method[facility]



     (COGENTIN)      5-24                          [id number] by mouth 3        

   st



     1 MG tablet      23:53:                          3D   (three)           Hos

porter



               52                                 times a           l



                                                  day.           

 

     fluPHENAZin      2022-0      Yes            1mg  QD   Take 1 mg           M

ethodi



     e         5-24                               by mouth           st



     (PROLIXIN)      23:53:                               daily.           Hospi

ta



     1 MG tablet      52                                 Unsure of           l



                                                  dose           

 

     dicyclomine      2022-0      Yes            20mg Q.25D Take 20 mg          

 [facility]



     (BENTYL) 20      5-24                               by mouth 4           st



     mg tablet      23:53:                               (four)           Hospit

a



               52                                 times a           l



                                                  day.           

 

     propranolol      2022-0      Yes            60mg Q.[id number] Take 60 mg     

      Method[facility]



     (INDERAL)      5-24                          [id number] by mouth 3         

  st



     60 MG      23:53:                          3D   (three)           Hospita



     tablet      52                                 times a           l



                                                  day.           

 

     pantoprazol      2022-0      Yes            20mg QD   Take 20 mg           

[facility]



     e         5-24                               by mouth           st



     (PROTONIX)      23:53:                               daily.           [facility]

ta



     20 MG EC      52                                                l



     tablet                                                        

 

     lithium 300      2022-0      Yes            300mg Q.[id number] Take 300      

     [facility]



     MG capsule      5-24                          [id number] mg by           st



               23:53:                          3D   mouth 3           [facility]



               52                                 (three)           l



                                                  times a           



                                                  day with           



                                                  meals.           

 

     DULoxetine      2022-0      Yes            60mg QD   Take 60 mg           M

[facility]



     (CYMBALTA)      5-24                               by mouth           st



     60 MG      23:53:                               daily.           [facility]



     capsule      52                                                l

 

     citalopram      2-0      Yes            10mg QD   Take 10 mg           [facility]

[facility]



     (CeleXA) 10      5-24                               by mouth           st



     MG tablet      23:53:                               daily.           [facility]

a



               52                                 Unsure of           l



                                                  dose           

 

     benztropine      2022-0      Yes            1mg  Q.[id number] Take 1 mg      

     [facility]



     (COGENTIN)      5-24                          [id number] by mouth 3        

   st



     1 MG tablet      23:53:                          3D   (three)           Hos

porter



               52                                 times a           l



                                                  day.           

 

     fluPHENAZin      2022-0      Yes            1mg  QD   Take 1 mg           [facility]

[facility]



     e         5-24                               by mouth           st



     (PROLIXIN)      23:53:                               daily.           [facility]

ta



     1 MG tablet      52                                 Unsure of           l



                                                  dose           

 

     dicyclomine      2022-0      Yes            20mg Q.25D Take 20 mg          

 [facility]



     (BENTYL) 20      5-24                               by mouth 4           st



     mg tablet      23:53:                               (four)           [facility]ospit

a



               52                                 times a           l



                                                  day.           

 

     propranolol      2022-0      Yes            60mg Q.[id number] Take 60 mg     

      [facility]



     (INDERAL)      5-24                          [id number] by mouth 3         

  st



     60 MG      23:53:                          3D   (three)           [facility]



     tablet      52                                 times a           l



                                                  day.           

 

     pantoprazol      2022-0      Yes            20mg QD   Take 20 mg           

[facility]



     e         5-24                               by mouth           st



     (PROTONIX)      23:53:                               daily.           [facility]

ta



     20 MG EC      52                                                l



     tablet                                                        

 

     lithium 300      2022-0      Yes            300mg Q.[id number] Take 300      

     [facility]



     MG capsule      5-24                          [id number] mg by           st



               23:53:                          3D   mouth 3           [facility]



               52                                 (three)           l



                                                  times a           



                                                  day with           



                                                  meals.           

 

     DULoxetine      2022-0      Yes            60mg QD   Take 60 mg           M

[facility]



     (CYMBALTA)      5-24                               by mouth           st



     60 MG      23:53:                               daily.           [facility]



     capsule      52                                                l

 

     citalopram      -0      Yes            10mg QD   Take 10 mg           M

[facility]



     (CeleXA) 10      5-24                               by mouth           st



     MG tablet      23:53:                               daily.           [facility]

a



               52                                 Unsure of           l



                                                  dose           

 

     benztropine      2022-0      Yes            1mg  Q.[id number] Take 1 mg      

     [facility]



     (COGENTIN)      5-24                          [id number] by mouth 3        

   st



     1 MG tablet      23:53:                          3D   (three)           Hos

porter



               52                                 times a           l



                                                  day.           

 

     fluPHENAZin      2-0      Yes            1mg  QD   Take 1 mg           M

[facility]



     e         5-24                               by mouth           st



     (PROLIXIN)      23:53:                               daily.           [facility]

ta



     1 MG tablet      52                                 Unsure of           l



                                                  dose           

 

     dicyclomine      2-0      Yes            20mg Q.25D Take 20 mg          

 [facility]



     (BENTYL) 20      5-24                               by mouth 4           st



     mg tablet      23:53:                               (four)           [facility]ospit

a



               52                                 times a           l



                                                  day.           

 

     propranolol      2022-0      Yes            60mg Q.[id number] Take 60 mg     

      [facility]



     (INDERAL)      5-24                          [id number] by mouth 3         

  st



     60 MG      23:53:                          3D   (three)           [facility]



     tablet      52                                 times a           l



                                                  day.           

 

     pantoprazol      2022-0      Yes            20mg QD   Take 20 mg           

[facility]



     e         5-24                               by mouth           st



     (PROTONIX)      23:53:                               daily.           [facility]

ta



     20 MG EC      52                                                l



     tablet                                                        

 

     lithium 300      2022-0      Yes            300mg Q.12312705 Take 300      

     [facility]



     MG capsule      5-24                          [id number] mg by           st



               23:53:                          3D   mouth 3           [facility]



               52                                 (three)           l



                                                  times a           



                                                  day with           



                                                  meals.           

 

     DULoxetine      2022-0      Yes            60mg QD   Take 60 mg           M

[facility]



     (CYMBALTA)      5-24                               by mouth           st



     60 MG      23:53:                               daily.           [facility]



     capsule      52                                                l

 

     citalopram      2022-0      Yes            10mg QD   Take 10 mg           M

[facility]



     (CeleXA) 10      5-24                               by mouth           st



     MG tablet      23:53:                               daily.           [facility]

a



               52                                 Unsure of           l



                                                  dose           

 

     benztropine      2022-0      Yes            1mg  Q.[id number] Take 1 mg      

     [facility]



     (COGENTIN)      5-24                          [id number] by mouth 3        

   st



     1 MG tablet      23:53:                          3D   (three)           Hos

porter



               52                                 times a           l



                                                  day.           

 

     fluPHENAZin      2022-0      Yes            1mg  QD   Take 1 mg           M

[facility]



     e         5-24                               by mouth           st



     (PROLIXIN)      23:53:                               daily.           [facility]

ta



     1 MG tablet      52                                 Unsure of           l



                                                  dose           

 

     dicyclomine      2-0      Yes            20mg Q.25D Take 20 mg          

 [facility]



     (BENTYL) 20      5-24                               by mouth 4           st



     mg tablet      23:53:                               (four)           [facility]ospit

a



               52                                 times a           l



                                                  day.           

 

     propranolol      2022-0      Yes            60mg Q.[id number] Take 60 mg     

      [facility]



     (INDERAL)      5-24                          [id number] by mouth 3         

  st



     60 MG      23:53:                          3D   (three)           [facility]



     tablet      52                                 times a           l



                                                  day.           

 

     pantoprazol      2022-0      Yes            20mg QD   Take 20 mg           

[facility]



     e         5-24                               by mouth           st



     (PROTONIX)      23:53:                               daily.           [facility]

ta



     20 MG EC      52                                                l



     tablet                                                        

 

     lithium 300      2-0      Yes            300mg Q.[id number] Take 300      

     [facility]



     MG capsule      5-24                          [id number] mg by           st



               23:53:                          3D   mouth 3           [facility]



               52                                 (three)           l



                                                  times a           



                                                  day with           



                                                  meals.           

 

     DULoxetine      2022-0      Yes            60mg QD   Take 60 mg           M

[facility]



     (CYMBALTA)      5-24                               by mouth           st



     60 MG      23:53:                               daily.           [facility]



     capsule      52                                                l

 

     citalopram      2022-0      Yes            10mg QD   Take 10 mg           M

[facility]



     (CeleXA) 10      5-24                               by mouth           st



     MG tablet      23:53:                               daily.           [facility]

a



               52                                 Unsure of           l



                                                  dose           

 

     benztropine      2022-0      Yes            1mg  Q.[id number] Take 1 mg      

     [facility]



     (COGENTIN)      5-24                          [id number] by mouth 3        

   st



     1 MG tablet      23:53:                          3D   (three)           Hos

porter



               52                                 times a           l



                                                  day.           

 

     fluPHENAZin      2022-0      Yes            1mg  QD   Take 1 mg           M

[facility]



     e         5-24                               by mouth           st



     (PROLIXIN)      23:53:                               daily.           [facility]

ta



     1 MG tablet      52                                 Unsure of           l



                                                  dose           

 

     dicyclomine      2022-0      Yes            20mg Q.25D Take 20 mg          

 [facility]



     (BENTYL) 20      5-24                               by mouth 4           st



     mg tablet      23:53:                               (four)           [facility]ospit

a



               52                                 times a           l



                                                  day.           

 

     propranolol      2022-0      Yes            60mg Q.[id number] Take 60 mg     

      [facility]



     (INDERAL)      5-24                          [id number] by mouth 3         

  st



     60 MG      23:53:                          3D   (three)           [facility]



     tablet      52                                 times a           l



                                                  day.           

 

     pantoprazol      2022-0      Yes            20mg QD   Take 20 mg           

[facility]



     e         5-24                               by mouth           st



     (PROTONIX)      23:53:                               daily.           [facility]

ta



     20 MG EC      52                                                l



     tablet                                                        

 

     lithium 300      2-0      Yes            300mg Q.[id number] Take 300      

     [facility]



     MG capsule      5-24                          [id number] mg by           st



               23:53:                          3D   mouth 3           [facility]



               52                                 (three)           l



                                                  times a           



                                                  day with           



                                                  meals.           

 

     DULoxetine      2022-0      Yes            60mg QD   Take 60 mg           M

[facility]



     (CYMBALTA)      5-24                               by mouth           st



     60 MG      23:53:                               daily.           [facility]



     capsule      52                                                l

 

     citalopram      2-0      Yes            10mg QD   Take 10 mg           M

[facility]



     (CeleXA) 10      5-24                               by mouth           st



     MG tablet      23:53:                               daily.           [facility]

a



               52                                 Unsure of           l



                                                  dose           

 

     benztropine      2022-0      Yes            1mg  Q.[id number] Take 1 mg      

     [facility]



     (COGENTIN)      5-24                          [id number] by mouth 3        

   st



     1 MG tablet      23:53:                          3D   (three)           Hos

porter



               52                                 times a           l



                                                  day.           

 

     fluPHENAZin      2022-0      Yes            1mg  QD   Take 1 mg           M

[facility]



     e         5-24                               by mouth           st



     (PROLIXIN)      23:53:                               daily.           [facility]

ta



     1 MG tablet      52                                 Unsure of           l



                                                  dose           

 

     dicyclomine      2022-0      Yes            20mg Q.25D Take 20 mg          

 [facility]



     (BENTYL) 20      5-24                               by mouth 4           st



     mg tablet      23:53:                               (four)           [facility]ospit

a



               52                                 times a           l



                                                  day.           

 

     propranolol      2022-0      Yes            60mg Q.[id number] Take 60 mg     

      [facility]



     (INDERAL)      5-24                          [id number] by mouth 3         

  st



     60 MG      23:53:                          3D   (three)           [facility]



     tablet      52                                 times a           l



                                                  day.           

 

     pantoprazol      2-0      Yes            20mg QD   Take 20 mg           

[facility]



     e         5-24                               by mouth           st



     (PROTONIX)      23:53:                               daily.           [facility]

ta



     20 MG EC      52                                                l



     tablet                                                        

 

     lithium 300      -0      Yes            300mg Q.45736793 Take 300      

     [facility]



     MG capsule      5-24                          [id number] mg by           st



               23:53:                          3D   mouth 3           [facility]



               52                                 (three)           l



                                                  times a           



                                                  day with           



                                                  meals.           

 

     DULoxetine      -0      Yes            60mg QD   Take 60 mg           M

[facility]



     (CYMBALTA)      5-24                               by mouth           st



     60 MG      23:53:                               daily.           [facility]



     capsule      52                                                l

 

     citalopram      -0      Yes            10mg QD   Take 10 mg           M

[facility]



     (CeleXA) 10      5-24                               by mouth           st



     MG tablet      23:53:                               daily.           [facility]

a



               52                                 Unsure of           l



                                                  dose           

 

     benztropine      -0      Yes            1mg  Q.[id number] Take 1 mg      

     [facility]



     (COGENTIN)      5-24                          [id number] by mouth 3        

   st



     1 MG tablet      23:53:                          3D   (three)           Hos

porter



               52                                 times a           l



                                                  day.           

 

     fluPHENAZin      2-0      Yes            1mg  QD   Take 1 mg           [facility]

[facility]



     e         5-24                               by mouth           st



     (PROLIXIN)      23:53:                               daily.           [facility]

ta



     1 MG tablet      52                                 Unsure of           l



                                                  dose           

 

     dicyclomine      2-0      Yes            20mg Q.25D Take 20 mg          

 [facility]



     (BENTYL) 20      5-24                               by mouth 4           st



     mg tablet      23:53:                               (four)           [facility]ospit

a



               52                                 times a           l



                                                  day.           

 

     propranolol      2-0      Yes            60mg Q.87952901 Take 60 mg     

      [facility]



     (INDERAL)      5-24                          [id number] by mouth 3         

  st



     60 MG      23:53:                          3D   (three)           [facility]



     tablet      52                                 times a           l



                                                  day.           

 

     amLODIPine      -0      Yes            2.5mg QD   Take 2.5           [facility]



     (NORVASC)      7-12                               mg by           [facility]



     2.5 MG      13:29:                               mouth           [facility]



     tablet      10                                 daily.           [facility]

 

     amLODIPine      2020-0      Yes            2.5mg QD   Take 2.5           [facility]



     (NORVASC)      7-12                               mg by           [facility]



     2.5 MG      13:29:                               mouth           [facility]



     tablet      10                                 daily.           [facility]

 

     amLODIPine      2020-0      Yes            2.5mg QD   Take 2.5           [facility]



     (NORVASC)      7-12                               mg by           [facility]



     2.5 MG      13:29:                               mouth           [facility]



     tablet      10                                 daily.           [facility]

 

     amLODIPine      2020-0      Yes            2.5mg QD   Take 2.5           [facility]



     (NORVASC)      7-12                               mg by           [facility]



     2.5 MG      13:29:                               mouth           [facility]



     tablet      10                                 daily.           [facility]

 

     amLODIPine      2020-0      Yes            2.5mg QD   Take 2.5           [facility]



     (NORVASC)      7-12                               mg by           [facility]



     2.5 MG      13:29:                               mouth           [facility]



     tablet      10                                 daily.           [facility]

 

     amLODIPine      2020-0      Yes            2.5mg QD   Take 2.5           [facility]



     (NORVASC)      7-12                               mg by           [facility]



     2.5 MG      13:29:                               mouth           [facility]



     tablet      10                                 daily.           [facility]

 

     amLODIPine      2020-0      Yes            2.5mg QD   Take 2.5           [facility]



     (NORVASC)      7-12                               mg by           [facility]



     2.5 MG      13:29:                               mouth           [facility]



     tablet      10                                 daily.           [facility]

 

     amLODIPine      2020-0      Yes            2.5mg QD   Take 2.5           [facility]



     (NORVASC)      7-12                               mg by           [facility]



     2.5 MG      13:29:                               mouth           [facility]



     tablet      10                                 daily.           [facility]

 

     pravastatin      2020-0      Yes            20mg QD   Take 20 mg           

[facility]



     (PRAVACHOL)      7-12                               by mouth           [facility]

[facility]



     20 MG      12:43:                               nightly.           [facility]



     tablet      13                                                [place]

 

     pravastatin      2020-0      Yes            20mg QD   Take 20 mg           

[facility]



     (PRAVACHOL)      7-12                               by mouth           [facility]

[facility]



     20 MG      12:43:                               nightly.           [facility]



     tablet      13                                                [place]

 

     pravastatin      2020-0      Yes            20mg QD   Take 20 mg           

[facility]



     (PRAVACHOL)      7-12                               by mouth           [facility]

[facility]



     20 MG      12:43:                               nightly.           [facility]



     tablet      13                                                [place]

 

     pravastatin      2020-0      Yes            20mg QD   Take 20 mg           

[facility]



     (PRAVACHOL)      7-12                               by mouth           [facility]

[facility]



     20 MG      12:43:                               nightly.           [facility]



     tablet      13                                                [facility]

 

     pravastatin      2020-0      Yes            20mg QD   Take 20 mg           

[facility]



     (PRAVACHOL)      7-12                               by mouth           [facility]

[facility]



     20 MG      12:43:                               nightly.           [facility]



     tablet      13                                                [facility]

 

     pravastatin      2020-0      Yes            20mg QD   Take 20 mg           

[facility]



     (PRAVACHOL)      7-12                               by mouth           [facility]

[facility]



     20 MG      12:43:                               nightly.           Medical



     tablet      13                                                Center

 

     pravastatin      2020-0      Yes            20mg QD   Take 20 mg           

[facility]



     (PRAVACHOL)      7-12                               by mouth           [facility]

[facility]



     20 MG      12:43:                               nightly.           Medical



     tablet      13                                                Center

 

     pravastatin      2020-0      Yes            20mg QD   Take 20 mg           

[facility]



     (PRAVACHOL)      7-12                               by mouth           [facility]

[facility]



     20 MG      12:43:                               nightly.           Medical



     tablet      13                                                Center

 

     dicyclomine      2020-0      Yes            20mg Q.5D Take 1           [facility]



     (BENTYL) 20      7-12                               tablet (20           [facility]

[facility]



     mg tablet      00:00:                               mg total)           Med

ical



               00                                 by mouth 2           Center



                                                  (two)           



                                                  times           



                                                  daily.           

 

     dicyclomine      2020-0      Yes            20mg Q.5D Take 1           [facility]



     (BENTYL) 20      7-12                               tablet (20           [facility]

[facility]



     mg tablet      00:00:                               mg total)           Med

ical



               00                                 by mouth 2           Center



                                                  (two)           



                                                  times           



                                                  daily.           

 

     dicyclomine      2020-0      Yes            20mg Q.5D Take 1           [facility]



     (BENTYL) 20      7-12                               tablet (20           [facility]

[facility]



     mg tablet      00:00:                               mg total)           Med

ical



               00                                 by mouth 2           Center



                                                  (two)           



                                                  times           



                                                  daily.           

 

     dicyclomine      2020-0      Yes            20mg Q.5D Take 1           [facility]



     (BENTYL) 20      7-12                               tablet (20           [facility]

[facility]



     mg tablet      00:00:                               mg total)           Med

ical



               00                                 by mouth 2           Center



                                                  (two)           



                                                  times           



                                                  daily.           

 

     dicyclomine      2020-0      Yes            20mg Q.5D Take 1           [facility]



     (BENTYL) 20      7-12                               tablet (20           [facility]

[facility]



     mg tablet      00:00:                               mg total)           Med

ical



               00                                 by mouth 2           Center



                                                  (two)           



                                                  times           



                                                  daily.           

 

     dicyclomine      2020-0      Yes            20mg Q.5D Take 1           [facility]



     (BENTYL) 20      7-12                               tablet (20           [facility]

[facility]



     mg tablet      00:00:                               mg total)           Med

ical



               00                                 by mouth 2           Center



                                                  (two)           



                                                  times           



                                                  daily.           

 

     dicyclomine      2020-0      Yes            20mg Q.5D Take 1           [facility]



     (BENTYL) 20      7-12                               tablet (20           [facility]

[facility]



     mg tablet      00:00:                               mg total)           Med

ical



               00                                 by mouth 2           Center



                                                  (two)           



                                                  times           



                                                  daily.           

 

     dicyclomine      2020-0      Yes            20mg Q.5D Take 1           [facility]



     (BENTYL) 20      7-12                               tablet (20           [facility]

[facility]



     mg tablet      00:00:                               mg total)           Med

ical



               00                                 by mouth 2           Center



                                                  (two)           



                                                  times           



                                                  daily.           

 

     famotidine      2020-0      Yes            20mg Q.5D Take 20 mg           [facility]

[facility]



     (PEPCID) 20      6-25                               by mouth 2           [facility]

[facility]



     MG tablet      00:00:                               (two)           Medical



               00                                 times           Center



                                                  daily.           

 

     famotidine      2020-0      Yes            20mg Q.5D Take 20 mg           [facility]

[facility]



     (PEPCID) 20      6-25                               by mouth 2           [facility]

[facility]



     MG tablet      00:00:                               (two)           Medical



               00                                 times           Center



                                                  daily.           

 

     famotidine      2020-0      Yes            20mg Q.5D Take 20 mg           [facility]

[facility]



     (PEPCID) 20      6-25                               by mouth 2           [facility]

[facility]



     MG tablet      00:00:                               (two)           Medical



               00                                 times           Center



                                                  daily.           

 

     famotidine      2020-0      Yes            20mg Q.5D Take 20 mg           [facility]

[facility]



     (PEPCID) 20      6-25                               by mouth 2           [facility]

[facility]



     MG tablet      00:00:                               (two)           Medical



               00                                 times           Center



                                                  daily.           

 

     famotidine      2020-0      Yes            20mg Q.5D Take 20 mg           [facility]

[facility]



     (PEPCID) 20      6-25                               by mouth 2           [facility]

[facility]



     MG tablet      00:00:                               (two)           Medical



               00                                 times           Center



                                                  daily.           

 

     famotidine      2020-0      Yes            20mg Q.5D Take 20 mg           [facility]

[facility]



     (PEPCID) 20      6-25                               by mouth 2           [facility]

[facility]



     MG tablet      00:00:                               (two)           Medical



               00                                 times           Center



                                                  daily.           

 

     famotidine      2020-0      Yes            20mg Q.5D Take 20 mg           [facility]

[facility]



     (PEPCID) 20      6-25                               by mouth 2           [facility]

[facility]



     MG tablet      00:00:                               (two)           Medical



               00                                 times           Center



                                                  daily.           

 

     famotidine      2020-0      Yes            20mg Q.5D Take 20 mg           [facility]

[facility]



     (PEPCID) 20      6-25                               by mouth 2           [facility]

[facility]



     MG tablet      00:00:                               (two)           Medical



               00                                 times           Center



                                                  daily.           

 

     BREO      2020-0      Yes                      TAKE 1           [facility]



     ELLIPTA      6-24                               PUFF BY           [facility]



     200-25      00:00:                               MOUTH           Medical



     mcg/dose      00                                 EVERY DAY           Center



     DsDv                                                        

 

     BREO      2020-0      Yes                      TAKE 1           [facility]



     ELLIPTA      6-24                               PUFF BY           [facility]



     200-25      00:00:                               MOUTH           Medical



     mcg/dose      00                                 EVERY DAY           Center



     DsDv                                                        

 

     BREO      2020-0      Yes                      TAKE 1           [facility]



     ELLIPTA      6-24                               PUFF BY           [facility]



     200-25      00:00:                               MOUTH           Medical



     mcg/dose      00                                 EVERY DAY           Center



     DsDv                                                        

 

     BREO      2020-0      Yes                      TAKE 1           [facility]



     ELLIPTA      6-24                               PUFF BY           [facility]



     200-25      00:00:                               MOUTH           Medical



     mcg/dose      00                                 EVERY DAY           Center



     DsDv                                                        

 

     BREO      2020-0      Yes                      TAKE 1           [facility]



     ELLIPTA      6-24                               PUFF BY           [facility]



     200-25      00:00:                               MOUTH           Medical



     mcg/dose      00                                 EVERY DAY           Center



     DsDv                                                        

 

     BREO      2020-0      Yes                      TAKE 1           [facility]



     ELLIPTA      6-24                               PUFF BY           [facility]



     200-25      00:00:                               MOUTH           Medical



     mcg/dose      00                                 EVERY DAY           Center



     DsDv                                                        

 

     BREO      2020-0      Yes                      TAKE 1           [facility]



     ELLIPTA      6-24                               PUFF BY           [facility]



     200-25      00:00:                               MOUTH           Medical



     mcg/dose      00                                 EVERY DAY           Center



     DsDv                                                        

 

     BREO      2020-0      Yes                      TAKE 1           [facility]



     ELLIPTA      6-24                               PUFF BY           [facility]



     200-25      00:00:                               MOUTH           Medical



     mcg/dose      00                                 EVERY DAY           Center



     Ds                                                        

 

     albuterol      -0      Yes                                     [facility]



     HFA       5-27                                              [facility]



     (VENTOLIN      00:00:                                              Medical



     HFA) 90      00                                                Center



     mcg/actuati                                                        



     on inhaler                                                        

 

     albuterol      2020-0      Yes                                     [facility]



     HFA       5-27                                              [facility]



     (VENTOLIN      00:00:                                              Medical



     HFA) 90      00                                                Center



     mcg/actuati                                                        



     on inhaler                                                        

 

     albuterol      2020-0      Yes                                     [facility]



     HFA       5-27                                              [facility]



     (VENTOLIN      00:00:                                              Medical



     HFA) 90      00                                                Center



     mcg/actuati                                                        



     on inhaler                                                        

 

     albuterol      2020-0      Yes                                     [facility]



     HFA       5-27                                              [facility]



     (VENTOLIN      00:00:                                              Medical



     HFA) 90      00                                                Center



     mcg/actuati                                                        



     on inhaler                                                        

 

     albuterol      2020-0      Yes                                     [facility]



     HFA       5-27                                              [facility]



     (VENTOLIN      00:00:                                              Medical



     HFA) 90      00                                                Center



     mcg/actuati                                                        



     on inhaler                                                        

 

     albuterol      2020-0      Yes                                     [facility]



     HFA       5-27                                              [facility]



     (VENTOLIN      00:00:                                              Medical



     HFA) 90      00                                                Center



     mcg/actuati                                                        



     on inhaler                                                        

 

     albuterol      2020-0      Yes                                     [facility]



     HFA       5-27                                              [facility]



     (VENTOLIN      00:00:                                              Medical



     HFA) 90      00                                                Center



     mcg/actuati                                                        



     on inhaler                                                        

 

     albuterol      2020-0      Yes                                     [facility]



     HFA       5-27                                              [facility]



     (VENTOLIN      00:00:                                              Medical



     HFA) 90      00                                                Center



     mcg/actuati                                                        



     on inhaler                                                        

 

     amLODIPine      2020-0      Yes                      midday.           [facility]



     (NORVASC)      5-22                                              [facility]



     10 MG      00:00:                                              Medical



     tablet      00                                                Center

 

     amLODIPine      2020-0      Yes                      midday.           [facility]



     (NORVASC)      5-22                                              [facility]



     10 MG      00:00:                                              Medical



     tablet      00                                                Center

 

     amLODIPine      2020-0      Yes                      midday.           [facility]



     (NORVASC)      5-22                                              [facility]



     10 MG      00:00:                                              Medical



     tablet      00                                                Center

 

     amLODIPine      2020-0      Yes                      midday.           [facility]



     (NORVASC)      5-22                                              [facility]



     10 MG      00:00:                                              Medical



     tablet      00                                                Center

 

     amLODIPine      2020-0      Yes                      midday.           [facility]



     (NORVASC)      5-22                                              [facility]



     10 MG      00:00:                                              Medical



     tablet      00                                                Center

 

     amLODIPine      2020-0      Yes                      midday.           [facility]



     (NORVASC)      5-22                                              [facility]



     10 MG      00:00:                                              Medical



     tablet      00                                                Center

 

     amLODIPine      2020-0      Yes                      midday.           [facility]



     (NORVASC)      5-22                                              [facility]



     10 MG      00:00:                                              Medical



     tablet      00                                                Center

 

     amLODIPine      2020-0      Yes                      midday.           [facility]



     (NORVASC)      5-22                                              [facility]



     10 MG      00:00:                                              Medical



     tablet      00                                                Center

 

     gabapentin      2020-0      Yes            600mg Q.[id number] Take 600       

    [facility]



     (NEURONTIN)      5-15                          [id number] mg by           [facility]



     600 MG      00:00:                          3D   mouth 3           Medical



     tablet      00                                 (three)           Center



                                                  times           



                                                  daily.           

 

     gabapentin      2020-0      Yes            600mg Q.[id number] Take 600       

    [facility]



     (NEURONTIN)      5-15                          [id number] mg by           [facility]



     600 MG      00:00:                          3D   mouth 3           Medical



     tablet      00                                 (three)           Center



                                                  times           



                                                  daily.           

 

     gabapentin      2020-0      Yes            600mg Q.[id number] Take 600       

    [facility]



     (NEURONTIN)      5-15                          [id number] mg by           [facility]



     600 MG      00:00:                          3D   mouth 3           Medical



     tablet      00                                 (three)           Center



                                                  times           



                                                  daily.           

 

     gabapentin      2020-0      Yes            600mg Q.[id number] Take 600       

    [facility]



     (NEURONTIN)      5-15                          [id number] mg by           [facility]



     600 MG      00:00:                          3D   mouth 3           Medical



     tablet      00                                 (three)           Center



                                                  times           



                                                  daily.           

 

     gabapentin      2020-0      Yes            600mg Q.[id number] Take 600       

    [facility]



     (NEURONTIN)      5-15                          [id number] mg by           [facility]



     600 MG      00:00:                          3D   mouth 3           Medical



     tablet      00                                 (three)           Center



                                                  times           



                                                  daily.           

 

     gabapentin      2020-0      Yes            600mg Q.[id number] Take 600       

    [facility]



     (NEURONTIN)      5-15                          [id number] mg by           [facility]



     600 MG      00:00:                          3D   mouth 3           Medical



     tablet      00                                 (three)           Center



                                                  times           



                                                  daily.           

 

     gabapentin      2020-0      Yes            600mg Q.[id number] Take 600       

    [facility]



     (NEURONTIN)      5-15                          [id number] mg by           [facility]



     600 MG      00:00:                          3D   mouth 3           Medical



     tablet      00                                 (three)           Center



                                                  times           



                                                  daily.           

 

     gabapentin      2020-0      Yes            600mg Q.[id number] Take 600       

    [facility]



     (NEURONTIN)      5-15                          [id number] mg by           [facility]



     600 MG      00:00:                          3D   mouth 3           Medical



     tablet      00                                 (three)           Center



                                                  times           



                                                  daily.           

 

     benztropine      2020-0      Yes            1mg  Q.[id number] 1 mg 3         

  [facility]



     (COGENTIN)      5-13                          [id number] (three)           

[facility]



     1 MG tablet      00:00:                          3D   times           Medic

al



               00                                 daily.           Center

 

     carBAMazepi      2020-0      Yes            200mg Q.5D Take 200           [facility]

[facility]



     ne        5-13                               mg by           [facility]



     (TEGRETOL      00:00:                               mouth 2           Medic

al



     XR) 200 MG      00                                 (two)           Center



     12 hr                                         times           



     tablet                                         daily.           

 

     fluphenazin      2020-0      Yes            5mg  Q.[id number] Take 5 mg      

     [facility]



     e         5-13                          [id number] by mouth 3           [facility]

[facility]



     (PROLIXIN)      00:00:                          3D   (three)           Medi

[facility]



     5 MG tablet      00                                 times           Center



                                                  daily.           

 

     benztropine      2020-0      Yes            1mg  Q.[id number] 1 mg 3         

  [facility]



     (COGENTIN)      5-13                          [id number] (three)           

[facility]



     1 MG tablet      00:00:                          3D   times           Medic

al



               00                                 daily.           Center

 

     carBAMazepi      2020-0      Yes            200mg Q.5D Take 200           [facility]

[facility]



     ne        5-13                               mg by           [facility]



     (TEGRETOL      00:00:                               mouth 2           Medic

al



     XR) 200 MG      00                                 (two)           Center



     12 hr                                         times           



     tablet                                         daily.           

 

     fluphenazin      2020-0      Yes            5mg  Q.[id number] Take 5 mg      

     [facility]



     e         5-13                          [id number] by mouth 3           [facility]



     (PROLIXIN)      00:00:                          3D   (three)           Medi

[facility]



     5 MG tablet      00                                 times           Center



                                                  daily.           

 

     benztropine      2020-0      Yes            1mg  Q.[id number] 1 mg 3         

  [facility]



     (COGENTIN)      5-13                          [id number] (three)           

[facility]



     1 MG tablet      00:00:                          3D   times           Medic

al



               00                                 daily.           Center

 

     carBAMazepi      2020-0      Yes            200mg Q.5D Take 200           C

[facility]



     ne        5-13                               mg by           [facility]



     (TEGRETOL      00:00:                               mouth 2           Medic

al



     XR) 200 MG      00                                 (two)           Center



     12 hr                                         times           



     tablet                                         daily.           

 

     fluphenazin      2020-0      Yes            5mg  Q.[id number] Take 5 mg      

     [facility]



     e         5-13                          [id number] by mouth 3           [facility]

[facility]



     (PROLIXIN)      00:00:                          3D   (three)           Medi

[facility]



     5 MG tablet      00                                 times           Center



                                                  daily.           

 

     benztropine      2020-0      Yes            1mg  Q.[id number] 1 mg 3         

  [facility]



     (COGENTIN)      5-13                          [id number] (three)           

[facility]



     1 MG tablet      00:00:                          3D   times           Medic

al



               00                                 daily.           Center

 

     carBAMazepi      2020-0      Yes            200mg Q.5D Take 200           C

[facility]



     ne        5-13                               mg by           [facility]



     (TEGRETOL      00:00:                               mouth 2           Medic

al



     XR) 200 MG      00                                 (two)           Center



     12 hr                                         times           



     tablet                                         daily.           

 

     fluphenazin      2020-0      Yes            5mg  Q.[id number] Take 5 mg      

     [facility]



     e         5-13                          [id number] by mouth 3           [facility]

[facility]



     (PROLIXIN)      00:00:                          3D   (three)           Medi

[facility]



     5 MG tablet      00                                 times           Center



                                                  daily.           

 

     benztropine      2020-0      Yes            1mg  Q.[id number] 1 mg 3         

  [facility]



     (COGENTIN)      5-13                          [id number] (three)           

[facility]



     1 MG tablet      00:00:                          3D   times           Medic

al



               00                                 daily.           Center

 

     carBAMazepi      2020-0      Yes            200mg Q.5D Take 200           C

[facility]



     ne        5-13                               mg by           [facility]



     (TEGRETOL      00:00:                               mouth 2           Medic

al



     XR) 200 MG      00                                 (two)           Center



     12 hr                                         times           



     tablet                                         daily.           

 

     fluphenazin      2020-0      Yes            5mg  Q.[id number] Take 5 mg      

     [facility]



     e         5-13                          [id number] by mouth 3           [facility]

[facility]



     (PROLIXIN)      00:00:                          3D   (three)           Medi

[facility]



     5 MG tablet      00                                 times           Center



                                                  daily.           

 

     benztropine      2020-0      Yes            1mg  Q.[id number] 1 mg 3         

  [facility]



     (COGENTIN)      5-13                          [id number] (three)           

[facility]



     1 MG tablet      00:00:                          3D   times           Medic

al



               00                                 daily.           Center

 

     carBAMazepi      2020-0      Yes            200mg Q.5D Take 200           C

[facility]



     ne        5-13                               mg by           [facility]



     (TEGRETOL      00:00:                               mouth 2           Medic

al



     XR) 200 MG      00                                 (two)           Center



     12 hr                                         times           



     tablet                                         daily.           

 

     fluphenazin      2020-0      Yes            5mg  Q.[id number] Take 5 mg      

     [facility]



     e         5-13                          [id number] by mouth 3           [facility]

[facility]



     (PROLIXIN)      00:00:                          3D   (three)           Medi

[facility]



     5 MG tablet      00                                 times           Center



                                                  daily.           

 

     benztropine      2020-0      Yes            1mg  Q.[id number] 1 mg 3         

  [facility]



     (COGENTIN)      5-13                          [id number] (three)           

[facility]



     1 MG tablet      00:00:                          3D   times           Medic

al



               00                                 daily.           Center

 

     carBAMazepi      2020-0      Yes            200mg Q.5D Take 200           C

[facility]



     ne        5-13                               mg by           [facility]



     (TEGRETOL      00:00:                               mouth 2           Medic

al



     XR) 200 MG      00                                 (two)           Center



     12 hr                                         times           



     tablet                                         daily.           

 

     fluphenazin      2020-0      Yes            5mg  Q.[id number] Take 5 mg      

     [facility]



     e         5-13                          [id number] by mouth 3           [facility]

[facility]



     (PROLIXIN)      00:00:                          3D   (three)           Medi

[facility]



     5 MG tablet      00                                 times           Center



                                                  daily.           

 

     benztropine      2020-0      Yes            1mg  Q.[id number] 1 mg 3         

  [facility]



     (COGENTIN)      5-13                          [id number] (three)           

[facility]



     1 MG tablet      00:00:                          3D   times           Medic

al



               00                                 daily.           Center

 

     carBAMazepi      2020-0      Yes            200mg Q.5D Take 200           C

[facility]



     ne        5-13                               mg by           [facility]



     (TEGRETOL      00:00:                               mouth 2           Medic

al



     XR) 200 MG      00                                 (two)           Center



     12 hr                                         times           



     tablet                                         daily.           

 

     fluphenazin      2020-0      Yes            5mg  Q.[id number] Take 5 mg      

     [facility]



     e         5-13                          [id number] by mouth 3           [facility]

[facility]



     (PROLIXIN)      00:00:                          3D   (three)           Medi

[facility]



     5 MG tablet      00                                 times           Center



                                                  daily.           

 

     amitriptyli      2020-0      Yes            25mg      Take 25 mg           

[facility]



     ne (ELAVIL)      5-09                               by mouth.           [facility]

[facility]



     25 MG      00:00:                                              Medical



     tablet      00                                                Center

 

     ADVAIR HFA      2020-0      Yes                      INHALE 2           [facility]



     230-21      5-09                               PUFF BY           [facility]



     mcg/actuati      00:00:                               MOUTH 2           Med

ical



     on inhaler      00                                 TIMES           Center



                                                  DAILY.           



                                                  RINSE           



                                                  MOUTH AND           



                                                  THROAT           



                                                  AFTER USE           

 

     pantoprazol      2020-0      Yes            40mg QD   Take 40 mg           

[facility]



     e         5-09                               by mouth           [facility]



     (PROTONIX)      00:00:                               daily.           Medic

al



     40 MG      00                                                Center



     tablet                                                        

 

     propranoloL      2020-0      Yes            80mg QD   Take 80 mg           

[facility]



     (INDERAL      5-09                               by mouth           [facility]



     LA) 80 MG      00:00:                               daily.           Medica

l



     24 hr      00                                                Center



     capsule                                                        

 

     amitriptyli      2020-0      Yes            25mg      Take 25 mg           

[facility]



     ne (ELAVIL)      5-09                               by mouth.           [facility]

[facility]



     25 MG      00:00:                                              Medical



     tablet      00                                                Center

 

     ADVAIR HFA      2020-0      Yes                      INHALE 2           [facility]



     230-21      5-09                               PUFF BY           [facility]



     mcg/actuati      00:00:                               MOUTH 2           Med

ical



     on inhaler      00                                 TIMES           Center



                                                  DAILY.           



                                                  RINSE           



                                                  MOUTH AND           



                                                  THROAT           



                                                  AFTER USE           

 

     pantoprazol      2020-0      Yes            40mg QD   Take 40 mg           

[facility]



     e         5-09                               by mouth           [facility]



     (PROTONIX)      00:00:                               daily.           Medic

al



     40 MG      00                                                Center



     tablet                                                        

 

     propranoloL      2020-0      Yes            80mg QD   Take 80 mg           

[facility]



     (INDERAL      5-09                               by mouth           [facility]



     LA) 80 MG      00:00:                               daily.           Medica

l



     24 hr      00                                                Center



     capsule                                                        

 

     amitriptyli      2020-0      Yes            25mg      Take 25 mg           

[facility]



     ne (ELAVIL)      5-09                               by mouth.           [facility]

[facility]



     25 MG      00:00:                                              Medical



     tablet      00                                                Center

 

     ADVAIR HFA      2020-0      Yes                      INHALE 2           [facility]



     230-21      5-09                               PUFF BY           [facility]



     mcg/actuati      00:00:                               MOUTH 2           Med

ical



     on inhaler      00                                 TIMES           Center



                                                  DAILY.           



                                                  RINSE           



                                                  MOUTH AND           



                                                  THROAT           



                                                  AFTER USE           

 

     pantoprazol      2020-0      Yes            40mg QD   Take 40 mg           

[facility]



     e         5-09                               by mouth           [facility]



     (PROTONIX)      00:00:                               daily.           Medic

al



     40 MG      00                                                Center



     tablet                                                        

 

     propranoloL      2020-0      Yes            80mg QD   Take 80 mg           

[facility]



     (INDERAL      5-09                               by mouth           [facility]



     LA) 80 MG      00:00:                               daily.           Medica

l



     24 hr      00                                                Center



     capsule                                                        

 

     amitriptyli      2020-0      Yes            25mg      Take 25 mg           

[facility]



     ne (ELAVIL)      5-09                               by mouth.           [facility]

[facility]



     25 MG      00:00:                                              Medical



     tablet      00                                                Center

 

     ADVAIR HFA      2020-0      Yes                      INHALE 2           [facility]



     230-21      5-09                               PUFF BY           [facility]



     mcg/actuati      00:00:                               MOUTH 2           Med

ical



     on inhaler      00                                 TIMES           Center



                                                  DAILY.           



                                                  RINSE           



                                                  MOUTH AND           



                                                  THROAT           



                                                  AFTER USE           

 

     pantoprazol      2020-0      Yes            40mg QD   Take 40 mg           

[facility]



     e         5-09                               by mouth           [facility]



     (PROTONIX)      00:00:                               daily.           Medic

al



     40 MG      00                                                Center



     tablet                                                        

 

     propranoloL      2020-0      Yes            80mg QD   Take 80 mg           

[facility]



     (INDERAL      5-09                               by mouth           [facility]



     LA) 80 MG      00:00:                               daily.           Medica

l



     24 hr      00                                                Center



     capsule                                                        

 

     amitriptyli      2020-0      Yes            25mg      Take 25 mg           

[facility]



     ne (ELAVIL)      5-09                               by mouth.           [facility]

[facility]



     25 MG      00:00:                                              Medical



     tablet      00                                                Center

 

     ADVAIR HFA      2020-0      Yes                      INHALE 2           [facility]



     230-21      5-09                               PUFF BY           [facility]



     mcg/actuati      00:00:                               MOUTH 2           Med

ical



     on inhaler      00                                 TIMES           Center



                                                  DAILY.           



                                                  RINSE           



                                                  MOUTH AND           



                                                  THROAT           



                                                  AFTER USE           

 

     pantoprazol      2020-0      Yes            40mg QD   Take 40 mg           

[facility]



     e         5-09                               by mouth           [facility]



     (PROTONIX)      00:00:                               daily.           Medic

al



     40 MG      00                                                Center



     tablet                                                        

 

     propranoloL      2020-0      Yes            80mg QD   Take 80 mg           

[facility]



     (INDERAL      5-09                               by mouth           [facility]



     LA) 80 MG      00:00:                               daily.           Medica

l



     24 hr      00                                                Center



     capsule                                                        

 

     amitriptyli      2020-0      Yes            25mg      Take 25 mg           

[facility]



     ne (ELAVIL)      5-09                               by mouth.           [facility]

[facility]



     25 MG      00:00:                                              Medical



     tablet      00                                                Center

 

     ADVAIR HFA      2020-0      Yes                      INHALE 2           [facility]



     230-21      5-09                               PUFF BY           [facility]



     mcg/actuati      00:00:                               MOUTH 2           Med

ical



     on inhaler      00                                 TIMES           Center



                                                  DAILY.           



                                                  RINSE           



                                                  MOUTH AND           



                                                  THROAT           



                                                  AFTER USE           

 

     pantoprazol      2020-0      Yes            40mg QD   Take 40 mg           

[facility]



     e         5-09                               by mouth           [facility]



     (PROTONIX)      00:00:                               daily.           Medic

al



     40 MG      00                                                Center



     tablet                                                        

 

     propranoloL      2020-0      Yes            80mg QD   Take 80 mg           

[facility]



     (INDERAL      5-09                               by mouth           [facility]



     [facility]) 80 MG      00:00:                               daily.           Medica

l



     24 hr      00                                                Center



     capsule                                                        

 

     amitriptyli      2020-0      Yes            25mg      Take 25 mg           

[facility]



     ne (ELAVIL)      5-09                               by mouth.           [facility]

[facility]



     25 MG      00:00:                                              Medical



     tablet      00                                                Center

 

     ADVAIR HFA      2020-0      Yes                      INHALE 2           [facility]



     230-21      5-09                               PUFF BY           [facility]



     mcg/actuati      00:00:                               MOUTH 2           Med

ical



     on inhaler      00                                 TIMES           Center



                                                  DAILY.           



                                                  RINSE           



                                                  MOUTH AND           



                                                  THROAT           



                                                  AFTER USE           

 

     pantoprazol      2020-0      Yes            40mg QD   Take 40 mg           

[facility]



     e         5-09                               by mouth           [facility]



     (PROTONIX)      00:00:                               daily.           Medic

al



     40 MG      00                                                Center



     tablet                                                        

 

     propranoloL      2020-0      Yes            80mg QD   Take 80 mg           

[facility]



     (INDERAL      5-09                               by mouth           [facility]



     [facility]) 80 MG      00:00:                               daily.           Medica

l



     24 hr      00                                                Center



     capsule                                                        

 

     amitriptyli      2020-0      Yes            25mg      Take 25 mg           

[facility]



     ne (ELAVIL)      5-09                               by mouth.           [facility]

[facility]



     25 MG      00:00:                                              Medical



     tablet      00                                                Center

 

     ADVAIR HFA      2020-0      Yes                      INHALE 2           [facility]



     230-21      5-09                               PUFF BY           [facility]



     mcg/actuati      00:00:                               MOUTH 2           Med

ical



     on inhaler      00                                 TIMES           Center



                                                  DAILY.           



                                                  RINSE           



                                                  MOUTH AND           



                                                  THROAT           



                                                  AFTER USE           

 

     pantoprazol      2020-0      Yes            40mg QD   Take 40 mg           

[facility]



     e         5-09                               by mouth           [facility]



     (PROTONIX)      00:00:                               daily.           Medic

al



     40 MG      00                                                Center



     tablet                                                        

 

     propranoloL      2020-0      Yes            80mg QD   Take 80 mg           

[facility]



     (INDERAL      5-09                               by mouth           [facility]



     [facility]) 80 MG      00:00:                               daily.           Medica

l



     24 hr      00                                                Center



     capsule                                                        

 

     citalopram      2020-0      Yes            40mg QD   Take 40 mg           C

[facility]



     (CELEXA) 40      5-06                               by mouth           [facility]



     MG tablet      00:00:                               daily.           [facility]

l



               00                                                Center

 

     citalopram      2020-0      Yes            40mg QD   Take 40 mg           C

[facility]



     (CELEXA) 40      5-06                               by mouth           [facility]



     MG tablet      00:00:                               daily.           [facility]

l



               00                                                [place]

 

     citalopram      2020-0      Yes            40mg QD   Take 40 mg           C

[facility]



     (CELEXA) 40      5-06                               by mouth           [facility]



     MG tablet      00:00:                               daily.           [facility]

l



               00                                                [place]

 

     citalopram      2020-0      Yes            40mg QD   Take 40 mg           C

[facility]



     (CELEXA) 40      5-06                               by mouth           [facility]



     MG tablet      00:00:                               daily.           [facility]

l



               00                                                [place]

 

     citalopram      2020-0      Yes            40mg QD   Take 40 mg           C

[facility]



     (CELEXA) 40      5-06                               by mouth           [facility]



     MG tablet      00:00:                               daily.           [facility]

l



               00                                                [place]

 

     citalopram      2020-0      Yes            40mg QD   Take 40 mg           C

[facility]



     (CELEXA) 40      5-06                               by mouth           [facility]

[facility]



     MG tablet      00:00:                               daily.           [facility]

l



               00                                                [place]

 

     citalopram      2020-0      Yes            40mg QD   Take 40 mg           C

[facility]



     (CELEXA) 40      5-06                               by mouth           [facility]

[facility]



     MG tablet      00:00:                               daily.           [facility]

l



               00                                                [place]

 

     citalopram      2020-0      Yes            40mg QD   Take 40 mg           C

[facility]



     (CELEXA) 40      5-06                               by mouth           [facility]

[facility]



     MG tablet      00:00:                               daily.           [facility]

l



               00                                                [place]

 

     sucralfate      2020-0      Yes            1g        1 g 3           [facility]



     (CARAFATE)      5-05                               (three)           [facility]



     1 gram      00:00:                               times           [facility]



     tablet      00                                 daily with           [facility]



                                                  meals.           

 

     sucralfate      2020-0      Yes            1g        1 g 3           [facility]



     (CARAFATE)      5-05                               (three)           [facility]



     1 gram      00:00:                               times           [facility]



     tablet      00                                 daily with           [facility]



                                                  meals.           

 

     sucralfate      2020-0      Yes            1g        1 g 3           [facility]



     (CARAFATE)      5-05                               (three)           [facility]



     1 gram      00:00:                               times           [facility]



     tablet      00                                 daily with           [facility]



                                                  meals.           

 

     sucralfate      2020-0      Yes            1g        1 g 3           [facility]



     (CARAFATE)      5-05                               (three)           [facility]



     1 gram      00:00:                               times           [facility]



     tablet      00                                 daily with           [facility]



                                                  meals.           

 

     sucralfate      2020-0      Yes            1g        1 g 3           [facility]



     (CARAFATE)      5-05                               (three)           [facility]



     1 gram      00:00:                               times           [facility]



     tablet      00                                 daily with           [facility]



                                                  meals.           

 

     sucralfate      2020-0      Yes            1g        1 g 3           [facility]



     (CARAFATE)      5-05                               (three)           [facility]



     1 gram      00:00:                               times           [facility]



     tablet      00                                 daily with           [facility]



                                                  meals.           

 

     sucralfate      2020-0      Yes            1g        1 g 3           [facility]



     (CARAFATE)      5-05                               (three)           [facility]



     1 gram      00:00:                               times           [facility]



     tablet      00                                 daily with           [facility]



                                                  meals.           

 

     sucralfate      2020-0      Yes            1g        1 g 3           [facility]



     (CARAFATE)      5-05                               (three)           [facility]



     1 gram      00:00:                               times           [facility]



     tablet      00                                 daily with           [facility]



                                                  meals.           

 

     DULoxetine      2020-0      Yes            60mg QD   Take 60 mg           C

[facility]



     (CYMBALTA)      3-03                               by mouth           [facility]



     60 MG      00:00:                               daily.           [facility]



     capsule      00                                                [place]

 

     DULoxetine      2020-0      Yes            60mg QD   Take 60 mg           C

[facility]



     (CYMBALTA)      3-03                               by mouth           [facility]



     60 MG      00:00:                               daily.           [facility]



     capsule      00                                                [place]

 

     DULoxetine      2020-0      Yes            60mg QD   Take 60 mg           C

[facility]



     (CYMBALTA)      3-03                               by mouth           [facility]



     60 MG      00:00:                               daily.           [facility]



     capsule      00                                                [place]

 

     DULoxetine      2020-0      Yes            60mg QD   Take 60 mg           C

[facility]



     (CYMBALTA)      3-03                               by mouth           [facility]



     60 MG      00:00:                               daily.           [facility]



     capsule      00                                                [place]

 

     DULoxetine      2020-0      Yes            60mg QD   Take 60 mg           C

[facility]



     (CYMBALTA)      3-03                               by mouth           [facility]



     60 MG      00:00:                               daily.           [facility]



     capsule      00                                                [place]

 

     DULoxetine      2020-0      Yes            60mg QD   Take 60 mg           C

[facility]



     (CYMBALTA)      3-03                               by mouth           [facility]



     60 MG      00:00:                               daily.           [facility]



     capsule      00                                                [place]

 

     DULoxetine      2020-0      Yes            60mg QD   Take 60 mg           C

[facility]



     (CYMBALTA)      3-03                               by mouth           [facility]



     60 MG      00:00:                               daily.           [facility]



     capsule      00                                                [place]

 

     DULoxetine      2020-0      Yes            60mg QD   Take 60 mg           C

[facility]



     (CYMBALTA)      3-03                               by mouth           [facility]



     60 MG      00:00:                               daily.           [facility]



     capsule      00                                                [place]

 

     (PRAVASTATI            Yes                      1 TAB BY           [facility]

C



     N SODIUM)                                     MOUTH           [facility]



     20 MG TABS      00:00:                               DAILY,FOR           ra

l



               00                                 CHOLESTERO           Health



                                                  L              

 

     PROPRANOLOL            Yes                                     [facility]



     HCL ER                                                    [facility]



     (PROPRANOLO      00:00:                                              ral



     L HCL) 80      00                                                Health



     MG                                                          



     [id number]                                                        

 

     (AMLODIPINE            Yes                      TAKE 1           [facility]



     BESYLATE)                                     TABLET BY           [facility]

io



     10 MG TABS      00:00:                               MOUTH           ral



               00                                 EVERY DAY           Health

 

     (SUCRALFATE            Yes                                     [facility]



     ) 1 GM TABS                                                    [facility]



               00:00:                                              ral



               00                                                [facility]

 

     (GABAPENTIN            Yes                                     [facility]



     ) 600 MG                                                    [facility]



     TABS      00:00:                                              ral



               00                                                Health







Vital Signs







             Vital Name   Observation Time Observation Value Comments     Source

 

             HEIGHT       2020-10-04   170.2 cm                  



                          00:00:00                               

 

             WEIGHT       2020-10-04   104.327 kg                



                          00:00:00                               

 

             HEIGHT       2020-10-04   170.2 cm                  



                          00:00:00                               

 

             WEIGHT       2020-10-04   104.327 kg                



                          00:00:00                               

 

             HEIGHT       2020   170.2 cm                  



                          00:00:00                               

 

             WEIGHT       2020   99.791 kg                 



                          00:00:00                               

 

             HEIGHT       2020   170.2 cm                  



                          00:00:00                               

 

             WEIGHT       2020   99.791 kg                 



                          00:00:00                               

 

             HEIGHT       2020   170.2 cm                  



                          00:00:00                               

 

             WEIGHT       2020   104.327 kg                



                          00:00:00                               

 

             HEIGHT       2020   170.2 cm                  



                          00:00:00                               

 

             WEIGHT       2020   104.327 kg                



                          00:00:00                               

 

             blood pressure, 2022   87 mm[Hg]                 [facility]

[facility]



             diastolic    11:15:26                               Health

 

             blood pressure, 2022   125 mm[Hg]                [facility]

nity



             systolic     11:15:26                               Health

 

             pulse rate   2022   89 /min                   [facility]

y



                          11:15:26                               Health

 

             weight E&M   2022   237.80 [lb_av]              [facility]

[facility]



                          11:15:26                               [facility]

 

             weight in    2022   108.09 kg                 [facility]

y



             kilograms E&M 11:15:26                               Health

 

             height in    2022   170.18 cm                 [facility]

y



             centimeters E&M 11:15:26                               Health

 

             BMI (body mass 2022   n/a                       [facility]

[facility]



             index) percentile 11:15:26                               Health

 

             Body Mass Index 2022   37.38 kg/m2               [facility]

n[facility]



             (Ratio)      11:15:26                               Health

 

             temperature site 2022   oral                      [facility]

[facility]



                          11:15:26                               Health

 

             BMI (body mass 2022   n/a                       [facility]

[facility]



             index) percentile 08:15:47                               Health

 

             Body Mass Index 2022   39.67 kg/m2               [facility]

nity



             (Ratio)      08:15:47                               Health

 

             blood pressure, 2022   80 mm[Hg]                 [facility]

nity



             diastolic    08:15:47                               Health

 

             blood pressure, 2022   119 mm[Hg]                [facility]

nity



             systolic     08:15:47                               Health

 

             pulse rate   2022   103 /min                  [facility]

y



                          08:15:47                               Health

 

             weight E&M   2022   252.40 [lb_av]              [facility]

[facility]



                          08:15:47                               Health

 

             weight in    2022   114.73 kg                 [facility]

y



             kilograms E&M 08:15:47                               Health

 

             height in    2022   170.18 cm                 [facility]

y



             centimeters E&M 08:15:47                               Health

 

             Systolic blood 2020   136 mm[Hg]   Location: [facility]

ns



             pressure     14:29:00                  Position:    



                                                    Sitting      

 

             Diastolic blood 2020   90 mm[Hg]    Location: [facility]

ans



             pressure     14:29:00                  Position:    



                                                    Sitting      

 

             Body height  2020   67 [in_us]                [facility]



                          14:29:00                               

 

             Weight       2020   243 [lb_av]               [facility]



                          14:29:00                               

 

             Body mass index 2020   38.06 kg/m2               [facility]

s



             (BMI) [Ratio] 14:29:00                               

 

             Body temperature 2020   96.2 [degF]  Method:      [facility]

ns



                          14:29:00                  Temporal     

 

             Heart Rate   2020   81 /min                   [facility]



                          14:29:00                               







Procedures







                Procedure       Date / Time     Performing      Source



                                Performed       Clinician       

 

                Most recent diastolic blood 2022      [facility]



                pressure 80-89 mm Hg 11:34:23                        Health

 

                Most recent systolic blood 2022      [facility]



                pressure less than 130 mm Hg 11:34:23                        [facility]

[facility]

 

                Vision          2022      Marylin Soria [facility]



                                09:12:59                        Health

 

                Venipuncture    2022      Marylin Soria [facility]



                                09:11:49                        Health

 

                Diagnostic evaluation with medical 2022      Zeferino Nur [facility]



                - [id number]         10:15:56        "[employer]

 

                Urine Drug Screen - In House 2022      Yvonne Nur [facility]



                                14:04:38        [facility]

 

                [U] XRAY FOOT MIN 3 VWS LEFT [id number] 2018                   

   [facility]



                                00:00:00                        

 

                [U] XRAY FOOT MIN 3 VWS LEFT [id number] 2018                   

   [facility]



                                00:00:00                        

 

                History of                                      [facility]



                Esophagogastroduodenoscopy                                 

 

                History of Colonoscopy                                 [facility]

[facility]

 

                History of Umbilical hernia repair                              

   [facility]







Plan of Care







             Planned Activity Planned Date Details      Comments     Source

 

             Future Scheduled 2023   COVID-19 VACCINE (#1)              [facility]



             Test         11:39:42     [code = COVID-19              



                                       VACCINE (#1)]              

 

             Future Scheduled 2023   Pneumococcal Vaccine:              [facility]



             Test         11:39:42     Pediatrics (0 to 5              



                                       Years) and At-Risk              



                                       Patients (6 to 64              



                                       Years) (1 - PCV) [code              



                                       = Pneumococcal              



                                       Vaccine: Pediatrics (0              



                                       to 5 Years) and              



                                       At-Risk Patients (6 to              



                                       64 Years) (1 - PCV)]              

 

             Future Scheduled 2023   Hepatitis C screening              [facility]



             Test         11:39:42     (procedure) [code =              



                                       675043875]                

 

             Future Scheduled 2023   Screening for              [facility]



             Test         11:39:42     malignant neoplasm of              



                                       cervix (procedure)              



                                       [code = 395514134]              

 

             Future Scheduled 2023   [facility]



             Test         11:39:42     [code = INFLUENZA              



                                       VACCINE]                  

 

             Future Scheduled 2023   BREAST CANCER              [facility]



             Test         11:39:42     SCREENING [code =              



                                       BREAST CANCER              



                                       SCREENING]                

 

             Future Scheduled 2023   COVID-19 VACCINE (#1)              [facility]



             Test         11:39:42     [code = COVID-19              



                                       VACCINE (#1)]              

 

             Future Scheduled 2023   Pneumococcal Vaccine:              [facility]



             Test         11:39:42     Pediatrics (0 to 5              



                                       Years) and At-Risk              



                                       Patients (6 to 64              



                                       Years) (1 - PCV) [code              



                                       = Pneumococcal              



                                       Vaccine: Pediatrics (0              



                                       to 5 Years) and              



                                       At-Risk Patients (6 to              



                                       64 Years) (1 - PCV)]              

 

             Future Scheduled 2023   Hepatitis C screening              [facility]



             Test         11:39:42     (procedure) [code =              



                                       634165704]                

 

             Future Scheduled 2023   Screening for              [facility]



             Test         11:39:42     malignant neoplasm of              



                                       cervix (procedure)              



                                       [code = [id number]]              

 

             Future Scheduled 2023   INFLUENZA VACCINE              [facility]

[facility]



             Test         11:39:42     [code = INFLUENZA              



                                       VACCINE]                  

 

             Future Scheduled 2023   BREAST CANCER              [facility]



             Test         11:39:42     SCREENING [code =              



                                       BREAST CANCER              



                                       SCREENING]                

 

             Future Scheduled 2022   COVID-19 VACCINE (#1)              [facility]



             Test         11:53:35     [code = COVID-19              



                                       VACCINE (#1)]              

 

             Future Scheduled 2022   Pneumococcal Vaccine:              [facility]



             Test         11:53:35     Pediatrics (0 to 5              



                                       Years) and At-Risk              



                                       Patients (6 to 64              



                                       Years) (1 - PCV) [code              



                                       = Pneumococcal              



                                       Vaccine: Pediatrics (0              



                                       to 5 Years) and              



                                       At-Risk Patients (6 to              



                                       64 Years) (1 - PCV)]              

 

             Future Scheduled 2022   Hepatitis C screening              [facility]



             Test         11:53:35     (procedure) [code =              



                                       [id number]]                

 

             Future Scheduled 2022   INFLUENZA VACCINE              [facility]

[facility]



             Test         11:53:35     [code = INFLUENZA              



                                       VACCINE]                  

 

             Future Scheduled 2022   BREAST CANCER              [facility]



             Test         11:53:35     SCREENING [code =              



                                       BREAST CANCER              



                                       SCREENING]                

 

             Future Scheduled 2022   HEPATITIS B VACCINES              [facility]

[facility]



             Test         13:36:34     (1 of 3 - 3-dose              



                                       series) [code =              



                                       HEPATITIS B VACCINES              



                                       (1 of 3 - 3-dose              



                                       series)]                  

 

             Future Scheduled 2022   COVID-19 VACCINE (#1)              [facility]



             Test         13:36:34     [code = COVID-19              



                                       VACCINE (#1)]              

 

             Future Scheduled 2022   Pneumococcal Vaccine:              [facility]



             Test         13:36:34     Pediatrics (0 to 5              



                                       Years) and At-Risk              



                                       Patients (6 to 64              



                                       Years) (1 - PCV) [code              



                                       = Pneumococcal              



                                       Vaccine: Pediatrics (0              



                                       to 5 Years) and              



                                       At-Risk Patients (6 to              



                                       64 Years) (1 - PCV)]              

 

             Future Scheduled 2022   Hepatitis C screening              [facility]



             Test         13:36:34     (procedure) [code =              



                                       678148678]                

 

             Future Scheduled 2022   Screening for              [facility]



             Test         13:36:34     malignant neoplasm of              



                                       cervix (procedure)              



                                       [code = [id number]]              

 

             Future Scheduled 2022   INFLUENZA VACCINE              Method

[facility]



             Test         13:36:34     [code = INFLUENZA              



                                       VACCINE]                  

 

             Future Scheduled 2022   BREAST CANCER              [facility]



             Test         13:36:34     SCREENING [code =              



                                       BREAST CANCER              



                                       SCREENING]                

 

             Future Scheduled 2022   HEPATITIS B VACCINES              Met

[facility]



             Test         15:35:42     (1 of 3 - 3-dose              



                                       series) [code =              



                                       HEPATITIS B VACCINES              



                                       (1 of 3 - 3-dose              



                                       series)]                  

 

             Future Scheduled 2022   COVID-19 VACCINE (#1)              [facility]



             Test         15:35:42     [code = COVID-19              



                                       VACCINE (#1)]              

 

             Future Scheduled 2022   Pneumococcal Vaccine:              [facility]



             Test         15:35:42     Pediatrics (0 to 5              



                                       Years) and At-Risk              



                                       Patients (6 to 64              



                                       Years) (1 - PCV) [code              



                                       = Pneumococcal              



                                       Vaccine: Pediatrics (0              



                                       to 5 Years) and              



                                       At-Risk Patients (6 to              



                                       64 Years) (1 - PCV)]              

 

             Future Scheduled 2022   Hepatitis C screening              [facility]



             Test         15:35:42     (procedure) [code =              



                                       410180538]                

 

             Future Scheduled 2022   Screening for              [facility]



             Test         15:35:42     malignant neoplasm of              



                                       cervix (procedure)              



                                       [code = [id number]]              

 

             Future Scheduled 2022   INFLUENZA VACCINE              Method

[facility]



             Test         15:35:42     [code = INFLUENZA              



                                       VACCINE]                  

 

             Future Scheduled 2022   HEPATITIS B VACCINES              Met

[facility]



             Test         15:35:42     (1 of 3 - 3-dose              



                                       series) [code =              



                                       HEPATITIS B VACCINES              



                                       (1 of 3 - 3-dose              



                                       series)]                  

 

             Future Scheduled 2022   COVID-19 VACCINE (#1)              [facility]



             Test         15:35:42     [code = COVID-19              



                                       VACCINE (#1)]              

 

             Future Scheduled 2022   Pneumococcal Vaccine:              [facility]



             Test         15:35:42     Pediatrics (0 to 5              



                                       Years) and At-Risk              



                                       Patients (6 to 64              



                                       Years) (1 - PCV) [code              



                                       = Pneumococcal              



                                       Vaccine: Pediatrics (0              



                                       to 5 Years) and              



                                       At-Risk Patients (6 to              



                                       64 Years) (1 - PCV)]              

 

             Future Scheduled 2022   Hepatitis C screening              [facility]



             Test         15:35:42     (procedure) [code =              



                                       [id number]]                

 

             Future Scheduled 2022   Screening for              [facility]



             Test         15:35:42     malignant neoplasm of              



                                       cervix (procedure)              



                                       [code = [id number]]              

 

             Future Scheduled 2022   INFLUENZA VACCINE              Method

[facility]



             Test         15:35:42     [code = INFLUENZA              



                                       VACCINE]                  

 

             Future Scheduled 2022   HEPATITIS B VACCINES              Met

[facility]



             Test         15:35:42     (1 of 3 - 3-dose              



                                       series) [code =              



                                       HEPATITIS B VACCINES              



                                       (1 of 3 - 3-dose              



                                       series)]                  

 

             Future Scheduled 2022   COVID-19 VACCINE (#1)              [facility]



             Test         15:35:42     [code = COVID-19              



                                       VACCINE (#1)]              

 

             Future Scheduled 2022   Pneumococcal Vaccine:              [facility]



             Test         15:35:42     Pediatrics (0 to 5              



                                       Years) and At-Risk              



                                       Patients (6 to 64              



                                       Years) (1 - PCV) [code              



                                       = Pneumococcal              



                                       Vaccine: Pediatrics (0              



                                       to 5 Years) and              



                                       At-Risk Patients (6 to              



                                       64 Years) (1 - PCV)]              

 

             Future Scheduled 2022   Hepatitis C screening              [facility]



             Test         15:35:42     (procedure) [code =              



                                       [id number]]                

 

             Future Scheduled 2022   Screening for              [facility]



             Test         15:35:42     malignant neoplasm of              



                                       cervix (procedure)              



                                       [code = [id number]]              

 

             Future Scheduled 2022   INFLUENZA VACCINE              Method

[facility]



             Test         15:35:42     [code = INFLUENZA              



                                       VACCINE]                  

 

             Future Scheduled 2022   HEPATITIS B VACCINES              Met

[facility]



             Test         15:35:42     (1 of 3 - 3-dose              



                                       series) [code =              



                                       HEPATITIS B VACCINES              



                                       (1 of 3 - 3-dose              



                                       series)]                  

 

             Future Scheduled 2022   COVID-19 VACCINE (#1)              [facility]



             Test         15:35:42     [code = COVID-19              



                                       VACCINE (#1)]              

 

             Future Scheduled 2022   Pneumococcal Vaccine:              [facility]



             Test         15:35:42     Pediatrics (0 to 5              



                                       Years) and At-Risk              



                                       Patients (6 to 64              



                                       Years) (1 - PCV) [code              



                                       = Pneumococcal              



                                       Vaccine: Pediatrics (0              



                                       to 5 Years) and              



                                       At-Risk Patients (6 to              



                                       64 Years) (1 - PCV)]              

 

             Future Scheduled 2022   Hepatitis C screening              [facility]



             Test         15:35:42     (procedure) [code =              



                                       [id number]]                

 

             Future Scheduled 2022   Screening for              [facility]



             Test         15:35:42     malignant neoplasm of              



                                       cervix (procedure)              



                                       [code = [id number]]              

 

             Future Scheduled 2022   INFLUENZA VACCINE              Method

[facility]



             Test         15:35:42     [code = INFLUENZA              



                                       VACCINE]                  

 

             Future Scheduled 2022   INFLUENZA VACCINE (#1)              C

[facility]



             Test         00:00:00     [code = INFLUENZA              Medical Ce

nter



                                       VACCINE (#1)]              

 

             Future Scheduled 2022   INFLUENZA VACCINE (#1)              C

[facility]



             Test         00:00:00     [code = INFLUENZA              Medical Ce

nter



                                       VACCINE (#1)]              

 

             Future Scheduled 2022   INFLUENZA VACCINE (#1)              C

[facility]



             Test         00:00:00     [code = INFLUENZA              Medical Ce

nter



                                       VACCINE (#1)]              

 

             Future Scheduled 2022   INFLUENZA VACCINE (#1)              C

[facility]



             Test         00:00:00     [code = INFLUENZA              Medical Ce

nter



                                       VACCINE (#1)]              

 

             Future Scheduled 2022   INFLUENZA VACCINE (#1)              C

[facility]



             Test         00:00:00     [code = INFLUENZA              Medical Ce

nter



                                       VACCINE (#1)]              

 

             Future Scheduled 2022   INFLUENZA VACCINE (#1)              C

[facility]



             Test         00:00:00     [code = INFLUENZA              Medical Ce

nter



                                       VACCINE (#1)]              

 

             Future Scheduled 2022   INFLUENZA VACCINE (#1)              C

[facility]



             Test         00:00:00     [code = INFLUENZA              Medical Ce

nter



                                       VACCINE (#1)]              

 

             Future Scheduled 2022   INFLUENZA VACCINE (#1)              C

[facility]



             Test         00:00:00     [code = INFLUENZA              Medical Ce

nter



                                       VACCINE (#1)]              

 

             Future Scheduled 2022   DEPRESSION SCREENING              [facility]



             Test         00:00:00     (12+) [code =              Medical Center



                                       DEPRESSION SCREENING              



                                       (12+)]                    

 

             Future Scheduled 2022   DEPRESSION SCREENING              [facility]



             Test         00:00:00     (12+) [code =              Medical Center



                                       DEPRESSION SCREENING              



                                       (12+)]                    

 

             Future Scheduled 2022   DEPRESSION SCREENING              [facility]



             Test         00:00:00     (12+) [code =              Medical Center



                                       DEPRESSION SCREENING              



                                       (12+)]                    

 

             Future Scheduled 2022   DEPRESSION SCREENING              [facility]



             Test         00:00:00     (12+) [code =              Medical Center



                                       DEPRESSION SCREENING              



                                       (12+)]                    

 

             Future Scheduled 2022   DEPRESSION SCREENING              [facility]



             Test         00:00:00     (12+) [code =              Medical Center



                                       DEPRESSION SCREENING              



                                       (12+)]                    

 

             Future Scheduled 2022   DEPRESSION SCREENING              [facility]



             Test         00:00:00     (12+) [code =              Medical Center



                                       DEPRESSION SCREENING              



                                       (12+)]                    

 

             Future Scheduled 2022   DEPRESSION SCREENING              [facility]



             Test         00:00:00     (12+) [code =              Medical Center



                                       DEPRESSION SCREENING              



                                       (12+)]                    

 

             Future Scheduled 2022   DEPRESSION SCREENING              [facility]



             Test         00:00:00     (12+) [code =              Medical Center



                                       DEPRESSION SCREENING              



                                       (12+)]                    

 

             Future Scheduled 2021-10-04   Tobacco Cessation              [facility]



             Test         00:00:00     Counseling and              [facility]

r



                                       Screening (12+) [code              



                                       = Tobacco Cessation              



                                       Counseling and              



                                       Screening (12+)]              

 

             Future Scheduled 2021-10-04   Tobacco Cessation              [facility]



             Test         00:00:00     Counseling and              [facility] Cent[facility]

r



                                       Screening (12+) [code              



                                       = Tobacco Cessation              



                                       Counseling and              



                                       Screening (12+)]              

 

             Future Scheduled 2021-10-04   Tobacco Cessation              [facility]



             Test         00:00:00     Counseling and              [facility] Cent[facility]

r



                                       Screening (12+) [code              



                                       = Tobacco Cessation              



                                       Counseling and              



                                       Screening (12+)]              

 

             Future Scheduled 2021-10-04   Tobacco Cessation              [facility]



             Test         00:00:00     Counseling and              [facility] Cent[facility]

r



                                       Screening (12+) [code              



                                       = Tobacco Cessation              



                                       Counseling and              



                                       Screening (12+)]              

 

             Future Scheduled 2021   MEDICARE ANNUAL              [facility]



             Test         00:00:00     WELLNESS (YEAR 2 or              Medical 

Center



                                       FIRST YEAR if no IPPE)              



                                       [code = MEDICARE              



                                       ANNUAL WELLNESS (YEAR              



                                       2 or FIRST YEAR if no              



                                       IPPE)]                    

 

             Future Scheduled 2021   MEDICARE ANNUAL              [facility]



             Test         00:00:00     WELLNESS (YEAR 2 or              Medical 

Center



                                       FIRST YEAR if no IPPE)              



                                       [code = MEDICARE              



                                       ANNUAL WELLNESS (YEAR              



                                       2 or FIRST YEAR if no              



                                       IPPE)]                    

 

             Future Scheduled 2021   MEDICARE ANNUAL              [facility]



             Test         00:00:00     WELLNESS (YEAR 2 or              Medical 

Center



                                       FIRST YEAR if no IPPE)              



                                       [code = MEDICARE              



                                       ANNUAL WELLNESS (YEAR              



                                       2 or FIRST YEAR if no              



                                       IPPE)]                    

 

             Future Scheduled 2021   MEDICARE ANNUAL              [facility]



             Test         00:00:00     WELLNESS (YEAR 2 or              Medical 

Center



                                       FIRST YEAR if no IPPE)              



                                       [code = MEDICARE              



                                       ANNUAL WELLNESS (YEAR              



                                       2 or FIRST YEAR if no              



                                       IPPE)]                    

 

             Future Scheduled 2021   MEDICARE ANNUAL              [facility]



             Test         00:00:00     WELLNESS (YEAR 2 or              Medical 

Center



                                       FIRST YEAR if no IPPE)              



                                       [code = MEDICARE              



                                       ANNUAL WELLNESS (YEAR              



                                       2 or FIRST YEAR if no              



                                       IPPE)]                    

 

             Future Scheduled 2021   MEDICARE ANNUAL              [facility]



             Test         00:00:00     WELLNESS (YEAR 2 or              Medical 

Center



                                       FIRST YEAR if no IPPE)              



                                       [code = MEDICARE              



                                       ANNUAL WELLNESS (YEAR              



                                       2 or FIRST YEAR if no              



                                       IPPE)]                    

 

             Future Scheduled 2021   MEDICARE ANNUAL              [facility]



             Test         00:00:00     WELLNESS (YEAR 2 or              Medical 

Center



                                       FIRST YEAR if no IPPE)              



                                       [code = MEDICARE              



                                       ANNUAL WELLNESS (YEAR              



                                       2 or FIRST YEAR if no              



                                       IPPE)]                    

 

             Future Scheduled 2021   MEDICARE ANNUAL              [facility]



             Test         00:00:00     WELLNESS (YEAR 2 or              Medical 

Center



                                       FIRST YEAR if no IPPE)              



                                       [code = MEDICARE              



                                       ANNUAL WELLNESS (YEAR              



                                       2 or FIRST YEAR if no              



                                       IPPE)]                    

 

             Future Scheduled 2017   Screening for              [facility]

es



             Test         00:00:00     malignant neoplasm [facility]              [facility]



                                       cervix (procedure)              



                                       [code = [id number]]              

 

             Future Scheduled 2017   Screening for              [facility]

es



             Test         00:00:00     malignant neoplasm of              [facility]



                                       cervix (procedure)              



                                       [code = 884879539]              

 

             Future Scheduled 2017   Screening for              [facility]

es



             Test         00:00:00     malignant neoplasm of              [facility]



                                       cervix (procedure)              



                                       [code = 354454397]              

 

             Future Scheduled 2017   Screening for              [facility]

es



             Test         00:00:00     malignant neoplasm of              [facility]



                                       cervix (procedure)              



                                       [code = 430556027]              

 

             Future Scheduled 2017   Screening for              [facility]

es



             Test         00:00:00     malignant neoplasm of              [facility]



                                       cervix (procedure)              



                                       [code = 819876824]              

 

             Future Scheduled 2017   Screening for              [facility]

es



             Test         00:00:00     malignant neoplasm of              [facility]



                                       cervix (procedure)              



                                       [code = 280467144]              

 

             Future Scheduled 2017   Screening for              [facility]

es



             Test         00:00:00     malignant neoplasm of              [facility]



                                       cervix (procedure)              



                                       [code = 773635498]              

 

             Future Scheduled 2017   Screening for              [facility]

es



             Test         00:00:00     malignant neoplasm of              [facility]



                                       cervix (procedure)              



                                       [code = 720663062]              

 

             Future Scheduled 2002   Lipid panel               [facility]

s



             Test         00:00:00     (procedure) [code =              [facility]



                                       [id number]]                 

 

             Future Scheduled 2002   Lipid panel               [facility]

s



             Test         00:00:00     (procedure) [code =              [facility]



                                       [id number]]                 

 

             Future Scheduled 2002   Lipid panel               [facility]

s



             Test         00:00:00     (procedure) [code =              [facility]



                                       [id number]]                 

 

             Future Scheduled 2002   Lipid panel               [facility]

s



             Test         00:00:00     (procedure) [code =              [facility]



                                       [id number]]                 

 

             Future Scheduled 2002   Lipid panel               [facility]

s



             Test         00:00:00     (procedure) [code =              [facility]



                                       [id number]]                 

 

             Future Scheduled 2002   Lipid panel               [facility]

s



             Test         00:00:00     (procedure) [code =              [facility]



                                       [id number]]                 

 

             Future Scheduled 2002   Lipid panel               [facility]

s



             Test         00:00:00     (procedure) [code =              [facility]



                                       [id number]]                 

 

             Future Scheduled 2002   Lipid panel               [facility]

s



             Test         00:00:00     (procedure) [code =              [facility]



                                       [id number]]                 

 

             Future Scheduled 2001   DTAP/TDAP/TD VACCINES              [facility]



             Test         00:00:00     (1 - Tdap) [code =              [facility]

enter



                                       DTAP/TDAP/TD VACCINES              



                                       (1 - Tdap)]               

 

             Future Scheduled 2001   DTAP/TDAP/TD VACCINES              [facility]



             Test         00:00:00     (1 - Tdap) [code =              [facility]

enter



                                       DTAP/TDAP/TD VACCINES              



                                       (1 - Tdap)]               

 

             Future Scheduled 2001   DTAP/TDAP/TD VACCINES              [facility]



             Test         00:00:00     (1 - Tdap) [code =              Medical C

enter



                                       DTAP/TDAP/TD VACCINES              



                                       (1 - Tdap)]               

 

             Future Scheduled 2001   DTAP/TDAP/TD VACCINES              [facility]



             Test         00:00:00     (1 - Tdap) [code =              Medical C

enter



                                       DTAP/TDAP/TD VACCINES              



                                       (1 - Tdap)]               

 

             Future Scheduled 2001   DTAP/TDAP/TD VACCINES              [facility]



             Test         00:00:00     (1 - Tdap) [code =              Medical C

enter



                                       DTAP/TDAP/TD VACCINES              



                                       (1 - Tdap)]               

 

             Future Scheduled 2001   DTAP/TDAP/TD VACCINES              [facility]



             Test         00:00:00     (1 - Tdap) [code =              Medical C

enter



                                       DTAP/TDAP/TD VACCINES              



                                       (1 - Tdap)]               

 

             Future Scheduled 2001   DTAP/TDAP/TD VACCINES              [facility]



             Test         00:00:00     (1 - Tdap) [code =              Medical C

enter



                                       DTAP/TDAP/TD VACCINES              



                                       (1 - Tdap)]               

 

             Future Scheduled 2001   DTAP/TDAP/TD VACCINES              [facility]



             Test         00:00:00     (1 - Tdap) [code =              Medical C

enter



                                       DTAP/TDAP/TD VACCINES              



                                       (1 - Tdap)]               

 

             Future Scheduled 2000   HEPATITIS C SCREENING              [facility]



             Test         00:00:00     [code = HEPATITIS C              [facility] 

Center



                                       SCREENING]                

 

             Future Scheduled 2000   HEPATITIS C SCREENING              [facility]



             Test         00:00:00     [code = HEPATITIS C              Medical 

Center



                                       SCREENING]                

 

             Future Scheduled 2000   HEPATITIS C SCREENING              [facility]



             Test         00:00:00     [code = HEPATITIS C              Medical 

Center



                                       SCREENING]                

 

             Future Scheduled 2000   HEPATITIS C SCREENING              [facility]



             Test         00:00:00     [code = HEPATITIS C              Medical 

Center



                                       SCREENING]                

 

             Future Scheduled 2000   HEPATITIS C SCREENING              [facility]



             Test         00:00:00     [code = HEPATITIS C              Medical 

Center



                                       SCREENING]                

 

             Future Scheduled 2000   HEPATITIS C SCREENING              [facility]



             Test         00:00:00     [code = HEPATITIS C              Medical 

Center



                                       SCREENING]                

 

             Future Scheduled 2000   HEPATITIS C SCREENING              [facility]



             Test         00:00:00     [code = HEPATITIS C              Medical 

Center



                                       SCREENING]                

 

             Future Scheduled 2000   HEPATITIS C SCREENING              [facility]



             Test         00:00:00     [code = HEPATITIS C              Medical 

Center



                                       SCREENING]                

 

             Future Scheduled 1994   Tobacco Cessation              [facility]



             Test         00:00:00     Counseling and              [facility] Cent[facility]

r



                                       Screening (12+) [code              



                                       = Tobacco Cessation              



                                       Counseling and              



                                       Screening (12+)]              

 

             Future Scheduled 1994   Tobacco Cessation              [facility]



             Test         00:00:00     Counseling and              Medical Cente

r



                                       Screening (12+) [code              



                                       = Tobacco Cessation              



                                       Counseling and              



                                       Screening (12+)]              

 

             Future Scheduled 1994   Tobacco Cessation              [facility]



             Test         00:00:00     Counseling and              Medical Cente

r



                                       Screening (12+) [code              



                                       = Tobacco Cessation              



                                       Counseling and              



                                       Screening (12+)]              

 

             Future Scheduled 1994   Tobacco Cessation              [facility]



             Test         00:00:00     Counseling and              Medical Cente

r



                                       Screening (12+) [code              



                                       = Tobacco Cessation              



                                       Counseling and              



                                       Screening (12+)]              

 

             Future Scheduled 1988   PNEUMOCOCCAL VACCINE              [facility]



             Test         00:00:00     0-64 YRS (1 - PCV)              Medical C

enter



                                       [code = PNEUMOCOCCAL              



                                       VACCINE 0-64 YRS (1 -              



                                       PCV)]                     

 

             Future Scheduled 1988   PNEUMOCOCCAL VACCINE              [facility]



             Test         00:00:00     0-64 YRS (1 - PCV)              Medical C

enter



                                       [code = PNEUMOCOCCAL              



                                       VACCINE 0-64 YRS (1 -              



                                       PCV)]                     

 

             Future Scheduled 1988   PNEUMOCOCCAL VACCINE              [facility]



             Test         00:00:00     0-64 YRS (1 - PCV)              Medical C

enter



                                       [code = PNEUMOCOCCAL              



                                       VACCINE 0-64 YRS (1 -              



                                       PCV)]                     

 

             Future Scheduled 1988   PNEUMOCOCCAL VACCINE              [facility]



             Test         00:00:00     0-64 YRS (1 - PCV)              Medical C

enter



                                       [code = PNEUMOCOCCAL              



                                       VACCINE 0-64 YRS (1 -              



                                       PCV)]                     

 

             Future Scheduled 1988   PNEUMOCOCCAL VACCINE              [facility]



             Test         00:00:00     0-64 YRS (1 - PCV)              Medical C

enter



                                       [code = PNEUMOCOCCAL              



                                       VACCINE 0-64 YRS (1 -              



                                       PCV)]                     

 

             Future Scheduled 1988   PNEUMOCOCCAL VACCINE              [facility]



             Test         00:00:00     0-64 YRS (1 - PCV)              Medical C

enter



                                       [code = PNEUMOCOCCAL              



                                       VACCINE 0-64 YRS (1 -              



                                       PCV)]                     

 

             Future Scheduled 1988   PNEUMOCOCCAL VACCINE              [facility]



             Test         00:00:00     0-64 YRS (1 - PCV)              Medical C

enter



                                       [code = PNEUMOCOCCAL              



                                       VACCINE 0-64 YRS (1 -              



                                       PCV)]                     

 

             Future Scheduled 1988   PNEUMOCOCCAL VACCINE              [facility]



             Test         00:00:00     0-64 YRS (1 - PCV)              Medical C

enter



                                       [code = PNEUMOCOCCAL              



                                       VACCINE 0-64 YRS (1 -              



                                       PCV)]                     

 

             Future Scheduled 1983   COVID-19 VACCINE (#1)              [facility]



             Test         00:00:00     [code = COVID-19              [facility]

ter



                                       VACCINE (#1)]              

 

             Future Scheduled 1983   COVID-19 VACCINE (#1)              [facility]



             Test         00:00:00     [code = COVID-19              [facility]

ter



                                       VACCINE (#1)]              

 

             Future Scheduled 1983   COVID-19 VACCINE (#1)              [facility]



             Test         00:00:00     [code = COVID-19              [facility]

ter



                                       VACCINE (#1)]              

 

             Future Scheduled 1983   COVID-19 VACCINE (#1)              [facility]



             Test         00:00:00     [code = COVID-19              [facility]

ter



                                       VACCINE (#1)]              

 

             Future Scheduled 1983   COVID-19 VACCINE (#1)              [facility]



             Test         00:00:00     [code = COVID-19              [facility]

ter



                                       VACCINE (#1)]              

 

             Future Scheduled 1983   COVID-19 VACCINE (#1)              [facility]



             Test         00:00:00     [code = COVID-19              [facility]

ter



                                       VACCINE (#1)]              

 

             Future Scheduled 1983   COVID-19 VACCINE (#1)              [facility]



             Test         00:00:00     [code = COVID-19              [facility]

ter



                                       VACCINE (#1)]              

 

             Future Scheduled 1983   COVID-19 VACCINE (#1)              [facility]



             Test         00:00:00     [code = COVID-19              Medical [facility]

ter



                                       VACCINE (#1)]              







Encounters







        Start   End     Encounter Admission Attending Care    Care    Encounter 

Source



        Date/Time Date/Time Type    Type    Clinicians Facility Department ID   

   

 

        2022         Outpatient                 [facility]     [id number]

 [place]



        14:29:47                                                 [id number]  [facility]

 

        2022         Outpatient         lc.keron [facility]     [id number] [facility]



        20:23:58                                                    [facility]

 

        2022-10-17         Outpatient         lc.keron [facility]     [id number] [facility]



        13:39:05                                                    [facility]

 

        2022         Outpatient                 [facility]     [id number]

 [place]



        09:17:22                                                 [id number] [place] [address]

 

        2022         Outpatient                 [facility]     [id number]

 [place]



        16:12:16                                                 [id number] [place] [address]

 

        2021         Inpatient                 [facility]   [facility]   [id number] 

[facility]



        03:04:31                                                 45      [facility]

 

        2020         Inpatient                 [facility]   NICK    [id number] 

[facility]



        01:56:00                                                 23      [facility]

 

        2020         Inpatient                 [facility]   NICK    [id number] 

[facility]



        02:44:00                                                 22      [facility]

 

        2020         Inpatient                 [facility]   NICK    [id number] 

[facility]



        22:07:00                                                 46      [facility]

 

        2020         Inpatient                 [facility]   NICK    [id number] 

[facility]



        05:40:00                                                 11      [facility]

 

        2019         Inpatient U               [facility]    MED     [id number]    [facility]

H



        12:02:00                                                         

 

        2022 Emergency EM      Ali, Ginnyb [facility]ANW   NICK    [id number]

[id number] [facility]



        17:34:00 20:36:00                                         [address] [place] [address]

 

        2022-10-18 2022-10-18 Outpatient         Carpenter_D VFP     VFP     177

[id number] [facility]



        00:00:00 00:00:00                                         [id number]  Family



                                                                        Practic



                                                                        e

 

        2022-10-03 2022-10-03 In-person         [facility]     Howard Younger 

Encounter/ [facility]



        00:00:00 00:00:00 encounter         o,              Behavioral [id number]

7 [facility]  [id number]  [facility]

 

        2022 Outpatient         [facility]     [id number]

[id number] [place]



        14:30:00 14:30:00                 [facility]

 

        2022 Office          Alta Chan [facility]     E

ncounter/ [facility]



        00:00:00 00:00:00 Visit           Srikanth Ammy                 [id number]

0 [facility]



                                                                [id number]  [facility]

 

        2022 Office          Leoz-Calliz [facility]     Encoun

ter/ [facility]



        00:00:00 00:00:00 Visit           o,                      [id number] Com

meaghan Courtney                 [id number]  [facility]

 

        2022 Emergency E       LUCIANA JACOBO    [facility]    7755    

[facility]



        00:47:00 02:02:00                 [facility]                          

 

        2022 Emergency E       LUCIANA JOHNSON    [facility]    7754  

  [facility]



        02:16:00 05:56:00                 CHELSEA                           

 

        2022 Emergency E       LUCIANA PLATA    [facility]    7753    

[facility]



        18:09:00 00:01:00                 ISMAIL                          

 

        2022 Emergency E       LUCIANA JACOBO    [facility]    7752    

[facility]



        21:09:00 03:46:00                 [facility]                          

 

        2022 Outpatient         Carpenter_D VFP     VFP     177

[id number] [facility]



        12:14:00 12:14:00                                         [id number]  Family



                                                                        Practic



                                                                        e

 

        2022 Office          Leoz-Calliz [facility]     Encoun

ter/ [facility]



        00:00:00 00:00:00 Visit           o,                      [id number] Com

meaghan Courtney                 [id number]  [facility]

 

        2022 Emergency EM      Emilee Eubanks [facility]   [facility]    [id number]

[id number] [facility]



        22:13:00 00:05:00                                         48      [facility]

 

        2022 Emergency EM      Emilee Eubanks [facility]   [facility]   [id number]

11-2 [facility]



        22:13:00 00:05:00                                         [id number] [facility]

 

        2022 Outpatient         [id number] DISP    DISP    597

[id number] Dispatc



        10:37:00 10:37:00                 3                       [id number]   h



                                                                        Health

 

        2022 Outpatient         [id number] DISP    DISP    597

[id number] Dispatc



        11:18:00 11:18:00                 3                       [id number]   h



                                                                        Health

 

        2022 Emergency E       LUCIANA LEWIS    7743 

   [facility]



        20:27:00 01:21:00                 KIMBERLY                           

 

        2022 Outpatient         [id number] DISP    DISP    597

[id number] Dispatc



        11:47:00 11:47:00                 3                          h



                                                                        Health

 

        2022 Outpatient         [id number] DISP    DISP    597

[id number] Dispatc



        10:12:00 10:12:00                 3                       [id number]   h



                                                                        Health

 

        2022 Outpatient         [id number] DISP    DISP    597

[id number] Dispatc



        10:02:00 10:02:00                 3                       12705   h



                                                                        Health

 

        2022 Outpatient         [id number] DISP    DISP    597

[id number] Dispatc



        10:24:00 10:24:00                 3                       [id number]   h



                                                                        Health

 

        2022 Inpatient EL      Edward-Matthiasyl [facility]   DAYS    02

[id number] [facility]



        10:15:00 10:15:00                 Vanessa eduardo                 22      [facility]

 

        2021 Emergency E       LUCIANA JACOBO    7734    

[facility]



        04:11:00 04:47:00                 [facility]                          

 

        2021 Outpatient         Carpenter_D VFP     VF     177

[id number] [facility]



        09:50:00 09:50:00                                         [id number]  Family



                                                                        Practic



                                                                        e

 

        2021 Emergency E       LUCIANA MCMULLEN    [id number]  

  LUCIANA



        02:23:00 09:09:00                 RONAK                          

 

        2021 Emergency E       LUCIANA HEREDIA    7691    

LUCIANA



        01:59:00 04:01:00                 JACQUES                          

 

        2021 Emergency E       LUCIANA HEREDIA    7690    

LUCIANA



        04:08:00 04:40:00                 JACQUES                          

 

        2021 Emergency E       JOHN GUTIÉRREZ [facility]    [facility]    7689

    [facility]



        02:02:00 05:00:00                                                 

 

        2020 Emergency E       VALERI MEYER [facility]    [facility]    7688

    [facility]



        01:10:00 02:46:00                                                 

 

        2020 Oliviernawaf         RAYMOND, [facility]     Gastroenter 70

[id number] [facility]



        14:00:00 14:00:00 t;              chacho SANFORD M.D.            [facility]



                        ZANE SANFORD                                            

 

        2020 Emergency E       JACOBO,   [facility]    [facility]    7687    

[facility]



        05:16:00 06:03:00                 [facility]                          

 

        2020 [facility]     

[id number] [facility]



        09:00:00 09:00:00                 Edil                     63      [facility]

 

        2020 Emergency E       JOSHUA, [facility]    [facility]    7686 

   [facility]



        16:03:00 20:40:00                 KIMBERLY                           

 

        2020-10-24 2020-10-26 Outpatient E       EB [facility]    [facility]    768

5    [facility]



        12:33:00 14:51:00                 HIEN WEBB                         

 

        2020-10-23 2020-10-23 Emergency E       TAMANNA, [facility]    [facility]    7684    

[facility]



        04:14:00 08:19:00                 YASIR                           

 

        2020-10-22 2020-10-22 Emergency E       GIOVANNA   [facility]    [facility]    7683    

[facility]



        18:43:00 23:39:00                 JACQUES                          

 

        2020-10-21 2020-10-21 Emergency E       VALERI MEYER [facility]    [facility]    7682

    [facility]



        21:50:00 23:09:00                                                 

 

        2020-10-04 2020-10-04 Emergency ER              [facility]    Emergency [id number]

[id number] [address]



        02:26:00 02:26:00                                                 

 

        2020 Emergency E       FRED [facility]    [facility]    7681   

 [facility]



        19:34:00 01:21:00                 GAIL                          

 

        2020 Emergency E       CYNDY  [facility]    [facility]    7680    

[facility]



        20:30:00 21:10:00                 CLAY                          

 

        2020 Emergency ER              [facility]    Emergency [id number]

1717 [facility]



        10:51:00 10:51:00                                                 

 

        2020-07-10 2020-07-10 Emergency E       TANJA [facility]    MHNE    7679    

MHNE



        19:50:00 22:13:00                 WICHO                         

 

        2020-06-15 2020-06-15 Emergency E       JOHN GUTIÉRREZ [facility]    MHNE    7664

    MHNE



        14:38:00 15:43:00                                                 

 

        2020 Emergency E       CYNDY  MH[facility]    MHNE    7663    

MHNE



        15:10:00 16:13:00                 CLAY                          

 

        2020 Emergency ER              [facility]    Emergency [id number]

2872 SL



        04:59:00 04:59:00                                                 

 

        2020 Emergency E       VALERI MEYER [facility]    MHNE    7662

    MHNE



        04:00:00 04:58:00                                                 

 

        2020 Emergency E       CYNTHIA  MHNE    MHNE    7661    

MHNE



        19:35:00 21:32:00                 LISA                           

 

        2020 Emergency E       VALERI MEYER [facility]    MHNE    7656

    MHNE



        00:54:00 03:23:00                                                 

 

        2020 2020-02-15 Emergency E       MCTIGUTALAT, MHTW    MHTW    7655   

 MHTW



        21:40:00 00:16:00                 ROGER                         

 

        2020 Emergency E               MH[facility]    MHNE    7654    

MHNE



        23:15:00 23:15:00                                                 

 

        2020 Emergency E               MHNE    MHNE    7653    

MHNE



        15:07:00 15:07:00                                                 

 

        2020 Emergency E       RANDOLPHQUITA, MHNE    MHNE    7652  

  MHNE



        14:11:00 16:30:00                 CHELSEA                           

 

        2020 Emergency E               MH[facility]    MHNE    7651    

MHNE



        06:01:00 06:01:00                                                 

 

        2020 Emergency E               MHNE    MHNE    7650    

MHNE



        17:55:00 17:55:00                                                 

 

        2019 Emergency E               MHNE    MHNE    7649    

MHNE



        05:18:00 05:18:00                                                 

 

        2019 Emergency E       VOLKOV, MHNE    MHNE    7628    

MHNE



        11:24:00 13:36:00                 WICHO                         

 

        2019 Inpatient E       MATTHEW, MHNE    MHNE    7627   

 MHNE



        02:02:00 11:28:00                 MIGUEL ANGEL                          

 

        2019 Emergency E               MHNE    MHNE    7626    

MHNE



        15:00:00 15:00:00                                                 

 

        2019-10-28 2019-10-28 Emergency E               MHTW    MHTW    7623    

MHTW



        16:31:00 16:31:00                                                 

 

        2019 Emergency E               MHNE    MHNE    7621    

MHNE



        09:21:00 09:21:00                                                 

 

        2019 Emergency E               MHNE    MHNE    7620    

MHNE



        14:16:00 14:16:00                                                 

 

        2019 Emergency E               MHNE    MHNE    7619    

MHNE



        10:08:00 10:08:00                                                 

 

        2019 Outpatient E               MHNE    MHNE    7614   

 MHNE



        07:51:00 07:51:00                                                 

 

        2019 Emergency E               MHNE    MHNE    7613    

MHNE



        22:03:00 22:03:00                                                 

 

        2019 Emergency E               MHNE    MHNE    7566    

MHNE



        21:13:00 21:13:00                                                 

 

        2019 Outpatient E               MHNE    MED     7565   

 MHNE



        00:01:00 00:01:00                                                 

 

        2018 Afia BOWEN, [facility] 4072

0433 [place]



        14:15:00 14:15:00 t;              ZANE SWIFT         Orthopedics       

  nader Grace M.D.                                            

 

        [facility]

[address] [place]



        09:45:00 09:45:00 t;              ZANE SWIFT         Orthopedics       

  nader Grace M.D.                                            

 

        2018 Afia BOWEN, [facility]     [id number]

25 [place]



        11:00:00 11:00:00 t;              ZANE SWIFT                         

nader Jain M.D.                                            







Results







           Test Description Test Time  Test Comments Results    Result Comments 

Source









                    COVID 19 INHOUSE AG 2022 19:57:00 









                      Test Item  Value      Reference Range Interpretation Comme

nts









             COVID 19 INHOUSE AG (test NEGATIVE     Negative                  Ne

gative results should be treated 

as



             code = [id number])                                        presumpt

jaci andconfirmed with a



                                                                 molecular assay

, if necessary for



                                                                 patientmanageme

nt. Negative results



                                                                 do not rule out

 COVID-19 andshould



                                                                 not be used as 

the sole basis for



                                                                 treatment orpat

ient management



                                                                 decisions, incl

uding infection



                                                                 controldecision

s. Negative results



                                                                 should be consi

dered in thecontext of



                                                                 a patient's rec

ent exposures, history



                                                                 and thepresence

 of clnical signs and



                                                                 symptoms consis

tent



                                                                 withCOVID-19.Sp

ecimen Source:



                                                                 Nasopharyngeal 

(NP) Swab



- XR CHEST 2 -11-44 19:27:00
************************************************************[facility] : 1980 Sex: 
F************************************************************Patient Name: 
FAMILIA WILSON  Unit No: [id number] EXAMS: CPT: [id number] XR CHEST 2 V [id number]
EXAM: XR CHEST 2 VIEWS DATE: 2022 5:43 PM INDICATION: cough COMPARISON: 
None. TECHNIQUE: PA and lateral chest radiographs.  FINDINGS: Lines, tubes and 
hardware: None. Lungs and pleura: Pulmonary vascularity is normal. The lungs are
clear. The costophrenic sulci are sharp without effusion. No pneumothorax is 
identified. Heart and mediastinum: The heart size is normal. The mediastinal 
contours are normal. Bones, soft tissues: No acute abnormality. IMPRESSION: No 
acute cardiopulmonary abnormality. ** Electronically Signed by Godwin Mcintyre MD ** ** on 2022 at 1927 ** Reported and signed by: Godwin Mcintyre MD 
CC: Julia Hobbs MD; Adi Benjamin DO  Technologist: Zara Garzon Time: 
DAP (Gy m2): Air Kerma (mGy): Trscr Dt/Tm: 2022 () by:[email or web address] Orig 
Print D/T: S: 2022 () BATCH NO: N/A  Name: FAMILIA WILSON [facility] Phys: IVANNA. Julia Hobbs  Edel Marie : 1980 
Age: 41 Sex: [place] [address] Acct No: [id number] Loc: N.ERS Exam Date:
2022 Status: REG ER PH: FAX: PAGE 1  Signed Reporttestosterone, total
2022 00:00:00





             Test Item    Value        Reference Range Interpretation Comments

 

             testosterone, total (test code = 42 ng/dL     2-45                 

     



             2986-8)                                             



[facility] plasma reagin antibody, [id number] 00:00:00





             Test Item    Value        Reference Range Interpretation Comments

 

             rapid plasma reagin antibody, NON-REACTIVE NON-REACTIVE N          

  



             serum (test code = 5291-0)                                        



[facility]lamydia DNA [id number] 00:00:00





             Test Item    Value        Reference Range Interpretation Comments

 

             chlamydia DNA probe (test code = NOT DETECTED NOT DETECTED N       

     



             24921-4)                                            



[facility]oglobin A1C, blood, as % of total [id number]
00:00:00





             Test Item    Value        Reference Range Interpretation Comments

 

             hemoglobin A1C, blood, as 5.6 % OF TOTAL HGB <5.7         N        

    



             % of total hemoglobin                                        



             (test code = 4548-4)                                        



[facility]itamin D 25-hydroxy, [id number] 00:00:00





             Test Item    Value        Reference Range Interpretation Comments

 

             vitamin D 25-hydroxy, serum (test 33 ng/mL            N      

      



             code = 69869-9)                                        



[facility]boroprolactin, [id number] 00:00:00





             Test Item    Value        Reference Range Interpretation Comments

 

             prolactin, serum (test code = 11.6 ng/mL                N          

  



             2842-3)                                             



[facility]patitis A antibody, [id number] 00:00:00





             Test Item    Value        Reference Range Interpretation Comments

 

             hepatitis A antibody, total (test REACTIVE     NON-REACTIVE A      

      



             code = 22511-4)                                        



[facility] core antibody, [id number] 00:00:00





             Test Item    Value        Reference Range Interpretation Comments

 

             hepatitis B core antibody, total NON-REACTIVE NON-REACTIVE N       

     



             (test code = 90574-0)                                        



[facility]boro[facility]patitis B surface [id number] 00:00:00





             Test Item    Value        Reference Range Interpretation Comments

 

             hepatitis B surface antibody NON-REACTIVE NON-REACTIVE N           

 



             (test code = 82484-6)                                        



[facility] Healthbas[facility] as percent of blood iyoaameauj6384-86-27 
00:00:00





             Test Item    Value        Reference Range Interpretation Comments

 

             basophils as percent of blood 0.6 %                     N          

  



             leukocytes (test code = 707-0)                                     

   



[facility]osinophils as percent of blood mbaqxezfqc8781-82-15 
00:00:00





             Test Item    Value        Reference Range Interpretation Comments

 

             eosinophils as percent of blood 2.9 %                     N        

    



             leukocytes (test code = 714-6)                                     

   



[facility] Healthmonocytes as percent of blood zlsgimtphw7102-36-55 
00:00:00





             Test Item    Value        Reference Range Interpretation Comments

 

             monocytes as percent of blood 7.0 %                     N          

  



             leukocytes (test code = 5905-5)                                    

    



[facility]borolymphocytes as percent of blood zcrgltzdaq3037-87-04 
00:00:00





             Test Item    Value        Reference Range Interpretation Comments

 

             lymphocytes as percent of blood 34.1 %                    N        

    



             leukocytes (test code = 736-9)                                     

   



[facility]boroneutrophils as percent of blood ympxvzkuir5424-22-38 
00:00:00





             Test Item    Value        Reference Range Interpretation Comments

 

             neutrophils as percent of blood 55.4 %                    N        

    



             leukocytes (test code = 770-8)                                     

   



[facility]borobasophil count, [id number] 00:00:00





             Test Item    Value        Reference Range Interpretation Comments

 

             basophil count, absolute (test 53 cells/uL  0-200        N         

   



             code = 04851-1)                                        



[facility]boroAbsolute Eosinophil [id number] 00:00:00





             Test Item    Value        Reference Range Interpretation Comments

 

             Absolute Eosinophil count (test 258 cells/mcL        N       

     



             code = 64821-4)                                        



[facility]boroAbsolute Monocyte [id number] 00:00:00





             Test Item    Value        Reference Range Interpretation Comments

 

             Absolute Monocyte count (test 623 cells/mcL 200-950      N         

   



             code = 93585-4)                                        



[facility] Greensborolymphocytes, zdnkbara9985-60-60 00:00:00





             Test Item    Value        Reference Range Interpretation Comments

 

             lymphocytes, absolute (test 3035 CELLS/-3900     N           

 



             code = 29699-5)                                        



[facility]boroAbsolute Neutrophil [id number] 00:00:00





             Test Item    Value        Reference Range Interpretation Comments

 

             Absolute Neutrophil count 4931 cells/mcL 4172-5016    N            



             (test code = 49147-1)                                        



[facility] [id number] 00:00:00





             Test Item    Value        Reference Range Interpretation Comments

 

             mean platelet volume (test code = 11.7 fL      7.5-12.5     N      

      



             776-5)                                              



[facility] Greensboroplatelet smsjy5704-72-55 00:00:00





             Test Item    Value        Reference Range Interpretation Comments

 

             platelet count (test code = 350 THOUSAND/-400      N         

   



             777-3)                                              



[facility] Greensborored blood cell distribution [id number] 00:00:00





             Test Item    Value        Reference Range Interpretation Comments

 

             red blood cell distribution width 19.0 %       11.0-15.0    H      

      



             (test code = 788-0)                                        



[facility] corpuscular hemoglobin concentration, [id number] 
00:00:00





             Test Item    Value        Reference Range Interpretation Comments

 

             mean corpuscular hemoglobin 31.0 G/DL    32.0-36.0    L            



             concentration, RBC (test code =                                    

    



             786-4)                                              



[facility] corpuscular hemoglobin, [id number] 00:00:00





             Test Item    Value        Reference Range Interpretation Comments

 

             mean corpuscular hemoglobin, RBC 23.1 pg      27.0-33.0    L       

     



             (test code = 785-6)                                        



[facility], [id number] 00:00:00





             Test Item    Value        Reference Range Interpretation Comments

 

             mean corpuscular volume, RBC (test 74.6 fL      80.0-100.0   L     

       



             code = 787-2)                                        



[facility]borohematocrit, [id number] 00:00:00





             Test Item    Value        Reference Range Interpretation Comments

 

             hematocrit, blood (test code = 4544-3) 40.0 %       35.0-45.0    N 

           



[facility]borohemoglobin, [id number] 00:00:00





             Test Item    Value        Reference Range Interpretation Comments

 

             hemoglobin, blood (test code = 12.4 g/dL    11.7-15.5    N         

   



             718-7)                                              



[facility]boroerythrocyte (RBC) [id number] 00:00:00





             Test Item    Value        Reference Range Interpretation Comments

 

             erythrocyte (RBC) count (test 5.36 MILLION/UL 3.80-5.10    H       

     



             code = 789-8)                                        



[facility] Greensboroleukocyte count, [id number] 00:00:00





             Test Item    Value        Reference Range Interpretation Comments

 

             leukocyte count, blood (test 8.9 THOUSAND/UL 3.8-10.8     N        

    



             code = 6690-2)                                        



[facility]boroalanine aminotransferase (SGPT), uyqer7400-55-44 00:00:00





             Test Item    Value        Reference Range Interpretation Comments

 

             alanine aminotransferase (SGPT), serum 11 1/L       6-29         N 

           



             (test code = 1742-6)                                        



[facility] Greensboroaspartate aminotransferase (SGOT), [id number] 
00:00:00





             Test Item    Value        Reference Range Interpretation Comments

 

             aspartate aminotransferase (SGOT), 18 1/L       10-30        N     

       



             serum (test code = 1920-8)                                        



[facility]boroalkaline phosphatase, [id number] 00:00:00





             Test Item    Value        Reference Range Interpretation Comments

 

             alkaline phosphatase, serum (test code 73 1/L              N 

           



             = 1783-0)                                           



[facility]borobilirubin, serum, [id number] 00:00:00





             Test Item    Value        Reference Range Interpretation Comments

 

             bilirubin, serum, total (test code 0.3 mg/dL    0.2-1.2      N     

       



             = 1975-2)                                           



[facility]boroalbumin/globulin ratio, [id number] 00:00:00





             Test Item    Value        Reference Range Interpretation Comments

 

             albumin/globulin ratio, serum 1.2 (calc)   1.0-2.5      N          

  



             (test code = 1759-0)                                        



[facility] Greensboroglobulins, serum, [id number] 00:00:00





             Test Item    Value        Reference Range Interpretation Comments

 

             globulins, serum, total (test 3.5 G/DL (CALC) 1.9-3.7      N       

     



             code = 2336-6)                                        



[facility] Healthalbumin, [id number] 00:00:00





             Test Item    Value        Reference Range Interpretation Comments

 

             albumin, serum (test code = 1751-7) 4.3 g/dL     3.6-5.1      N    

        



[facility] Healthprotein, total, [id number] 00:00:00





             Test Item    Value        Reference Range Interpretation Comments

 

             protein, total, serum (test code = 7.8 g/dL     6.1-8.1      N     

       



             2885-2)                                             



[facility] Greensborocalcium, [id number] 00:00:00





             Test Item    Value        Reference Range Interpretation Comments

 

             calcium, serum (test code = -8) 9.6 mg/dL    8.6-10.2     N    

        



[facility] Greensborocarbon dioxide, venous [id number] 00:00:00





             Test Item    Value        Reference Range Interpretation Comments

 

             carbon dioxide, venous blood (test 25 mmol/L    20-32        N     

       



             code = 2027-1)                                        



[facility] Healthchloride, [id number] 00:00:00





             Test Item    Value        Reference Range Interpretation Comments

 

             chloride, serum (test code = 106 mmol/L          N           

 



             2075-0)                                             



[facility] Healthpotassium, [id number] 00:00:00





             Test Item    Value        Reference Range Interpretation Comments

 

             potassium, serum (test code = 4.4 mmol/L   3.5-5.3      N          

  



             2823-3)                                             



[facility] Greensborosodium, [id number] 00:00:00





             Test Item    Value        Reference Range Interpretation Comments

 

             sodium, serum (test code = 2951-2) 139 mmol/L   135-146      N     

       



[facility] Greensborourea nitrogen/creatinine ratio, [id number] 00:00:00





             Test Item    Value        Reference Range Interpretation Comments

 

             urea         NOT APPLICABLE (calc) 6-22                      



             nitrogen/creatinine                                        



             ratio, serum (test code                                        



             = 3097-3)                                           



[facility] Greensborocreatinine, [id number] 00:00:00





             Test Item    Value        Reference Range Interpretation Comments

 

             creatinine, serum (test code = 0.87 mg/dL   0.50-0.97    N         

   



             2160-0)                                             



[facility] Greensborourea nitrogen, [id number] 00:00:00





             Test Item    Value        Reference Range Interpretation Comments

 

             urea nitrogen, blood (test code = 9 mg/dL      7-25         N      

      



             3094-0)                                             



[facility] Greensboroblood glucose, [id number] 00:00:00





             Test Item    Value        Reference Range Interpretation Comments

 

             blood glucose, random (test code = 102 mg/dL    65-99        H     

       



             2339-0)                                             



[facility] GreensboroHIV-CMIA (Chemiluminescent Microparticle Immuno Assay)
2022 00:00:00





             Test Item    Value        Reference Range Interpretation Comments

 

             HIV-CMIA (Chemiluminescent NON-REACTIVE NON-REACTIVE N            



             Microparticle Immuno Assay)                                        



             (test code = 25677-6)                                        



[facility] GreensboroLDL cholesterol, [id number] 00:00:00





             Test Item    Value        Reference Range Interpretation Comments

 

             LDL cholesterol, serum (test 173 MG/DL (CALC)              H       

     



             code = -1)                                        



[facility]borotriglyceride, serum, [id number] 00:00:00





             Test Item    Value        Reference Range Interpretation Comments

 

             triglyceride, serum, fasting (test 265 mg/dL    <150         H     

       



             code = 2571-8)                                        



[facility] GreensboroHDL cholesterol, [id number] 00:00:00





             Test Item    Value        Reference Range Interpretation Comments

 

             HDL cholesterol, 45 mg/dL     See_Comment  L             [Automated

 message] The



             serum (test code =                                        system wh

ich generated



             2085)                                             this result tra

nsmitted



                                                                 reference range

: > OR =



                                                                 50. The referen

ce range



                                                                 was not used to

 interpret



                                                                 this result as



                                                                 normal/abnormal

.



[facility] [id number] 00:00:00





             Test Item    Value        Reference Range Interpretation Comments

 

             cholesterol, serum (test code = 261 mg/dL    <200         H        

    



             -3)                                             



[facility]rho[facility] [id number] 00:00:00





             Test Item    Value        Reference Range Interpretation Comments

 

             Neisseria gonorrhoeae DNA probe NOT DETECTED NOT DETECTED N        

    



             (test code = 20358-4)                                        



[facility] [id number] 00:00:00





             Test Item    Value        Reference Range Interpretation Comments

 

             estradiol, serum (test code = 286) 119 pg/mL                 N     

       



[facility]roid [facility] with reflex FT4
2022 00:00:00





             Test Item    Value        Reference Range Interpretation Comments

 

             TSH (thyroid stimulating hormone) 1.15 mIU/L                N      

      



             with reflex FT4 (test code =                                       

 



             57411)                                              



[facility], non-HDL, [id number] 00:00:00





             Test Item    Value        Reference Range Interpretation Comments

 

             cholesterol, non-HDL, total 216 MG/DL (CALC) <130         H        

    



             (test code = 87794)                                        



[facility] ratio, serum, [id number] 00:00:00





             Test Item    Value        Reference Range Interpretation Comments

 

             cholesterol/HDL ratio, serum, 5.8 (calc)   <5.0         H          

  



             percent (test code = 2404)                                        



[facility] [id number] 23:57:00





             Test Item    Value        Reference Range Interpretation Comments

 

             WHITE BLOOD CELL (test code = WBC) 14.1 K/mm3   4.1-12.1     H     

       

 

             RED BLOOD CELL (test code = RBC) 5.14 M/mm3   3.8-5.5      N       

     

 

             HEMOGLOBIN (test code = HGB) 12.3 G/DL    10.6-15.8    N           

 

 

             HEMATOCRIT (test code = HCT) 38.8 %       31.8-47.4    N           

 

 

             MEAN CELL VOLUME (test code = MCV) 75.5 fL      80.1-101.1   L     

       

 

             MEAN CELL HGB (test code = MCH) 23.9 pg      25.3-35.3    L        

    

 

             MEAN CELL HGB CONCETRATION (test 31.7 G/DL    32.7-35.1    L       

     



             code = MCHC)                                        

 

             RED CELL DISTRIBUTION WIDTH (test 17.2 %       12.2-16.4    H      

      



             code = RDW)                                         

 

             PLATELET COUNT (test code = PLT) 243 K/mm3    155-337      N       

     

 

             MEAN PLATELET VOLUME (test code = 11.8 fL      6.8-11.2     H      

      



             MPV)                                                



BASIC METABOLIC [id number] 23:52:00





             Test Item    Value        Reference Range Interpretation Comments

 

             SODIUM (test code = 134.0 mmol/L 133-144      N            



             NA)                                                 

 

             POTASSIUM (test 3.9 mmol/L   3.5-5.1      N            



             code = K)                                           

 

             CHLORIDE (test code 105 mmol/L          N            



             = CL)                                               

 

             CARBON DIOXIDE 22 mmol/L    21-32        N            



             (test code = CO2)                                        

 

             ANION GAP (test 7.0 GAP calc 4.0-15.0     N            



             code = GAP)                                         

 

             GLUCOSE (test code 105 MG/DL           N            



             = GLU)                                              

 

             BLOOD UREA NITROGEN 8 MG/DL      7-18         N            



             (test code = BUN)                                        

 

             CREATININE (test 1.01 MG/DL   0.55-1.30    N            Results may

 be



             code = CREAT)                                        depressed if p

atient



                                                                 is



                                                                 takingN-Acetylc

ystei



                                                                 ne (NAC) and



                                                                 Metamizole



                                                                 (Dipyrone).

 

             CALCIUM (test code 9.0 MG/DL    8.5-10.1     N            



             = CA)                                               

 

             INDEX HEMOLYSIS 3 SMALL 25-50 See_Comment                [Automated

 message]



             (test code = MG Index/DL                            The system [employer])                                           generated this



                                                                 result transmit

fredrick



                                                                 reference range

: 1



                                                                 NORMAL. The



                                                                 reference range

 was



                                                                 not used to



                                                                 interpret this



                                                                 result as



                                                                 normal/abnormal

.

 

             INDEX ICTERIC (test 1 NORMAL <2 MG See_Comment                [Auto

mated message]



             code = ICTINDEX) Index/DL                               The system 

which



                                                                 generated this



                                                                 result transmit

fredrick



                                                                 reference range

: 1



                                                                 NORMAL. The



                                                                 reference range

 was



                                                                 not used to



                                                                 interpret this



                                                                 result as



                                                                 normal/abnormal

.

 

             INDEX LIPEMIA (test 1 NORMAL <50 MG See_Comment                [Aut

omated message]



             code = LIPINDEX) Index/DL                               The system 

which



                                                                 generated this



                                                                 result transmit

fredrick



                                                                 reference range

: 1



                                                                 NORMAL. The



                                                                 reference range

 was



                                                                 not used to



                                                                 interpret this



                                                                 result as



                                                                 normal/abnormal

.



HEPATIC FUNCTION [id number] 23:52:00





             Test Item    Value        Reference Range Interpretation Comments

 

             TOTAL PROTEIN (test code = PROT) 8.1 G/DL     6.4-8.2      N       

     

 

             ALBUMIN (test code = ALB) 3.6 G/DL     3.4-5.0      N            

 

             BILIRUBIN TOTAL (test code = 0.31 MG/DL   0.00-1.00    N           

 



             BILT)                                               

 

             BILIRUBIN DIRECT (test code = < 0.10 MG/DL 0.00-0.30    N          

  



             BILD)                                               

 

             BILIRUBIN INDIRECT (test code = 0.31 MG/DL   0.2-1.3      N        

    



             BILIND)                                             

 

             SGOT/AST (test code = AST) 28 Unit/L    15-37        N            

 

             SGPT/ALT (test code = ALT) 23 Unit/L    12-78        N            

 

             ALKALINE PHOSPHATASE TOTAL (test 78 Unit/L           N       

     



             code = ALKP)                                        



[id number] 23:52:00





             Test Item    Value        Reference Range Interpretation Comments

 

             LIPASE (test code = LIP) 65 Unit/L    114-286      L            



UA RFLX MICR CULT IF [id number] 23:05:00





             Test Item    Value        Reference Range Interpretation Comments

 

             UA COLOR (test code = LIGHT-YELLOW YELLOW                    



             COLU)        DESCRIPT                               

 

             UA APPEARANCE (test TURBID       CLEAR        A            



             code = APPU) (1+)HAZY-CLDY                           



                          DESCRIPT                               

 

             UA GLUCOSE DIPSTICK NORMAL (0)   See_Comment                [Automa

fredrick



             (test code = DGLUU) mg/dL                                  message]

 The



                                                                 system which



                                                                 generated this



                                                                 result transmit

fredrick



                                                                 reference range

: 0



                                                                 (NORMAL). The



                                                                 reference range



                                                                 was not used to



                                                                 interpret this



                                                                 result as



                                                                 normal/abnormal

.

 

             UA BILIRUBIN DIPSTICK NEGATIVE (0.0) See_Comment                [Au

tomated



             (test code = BILU) mg/dL                                  message] 

The



                                                                 system which



                                                                 generated this



                                                                 result transmit

fredrick



                                                                 reference range

:



                                                                 (NEG) 0. The



                                                                 reference range



                                                                 was not used to



                                                                 interpret this



                                                                 result as



                                                                 normal/abnormal

.

 

             UA KETONE DIPSTICK NEGATIVE (0) See_Comment                [Automat

ed



             (test code = KETU) mg/dL                                  message] 

The



                                                                 system which



                                                                 generated this



                                                                 result transmit

fredrick



                                                                 reference range

:



                                                                 (NEG) 0. The



                                                                 reference range



                                                                 was not used to



                                                                 interpret this



                                                                 result as



                                                                 normal/abnormal

.

 

             UA SPECIFIC GRAVITY 1.004 SG     1.001-1.035               



             (test code = SGU)                                        

 

             UA BLOOD DIPSTICK NEGATIVE (0.00) See_Comment                [Autom

ated



             (test code = FREDDIE) mg/dL                                  message] T

he



                                                                 system which



                                                                 generated this



                                                                 result transmit

fredrick



                                                                 reference range

: 0



                                                                 (NEG). The



                                                                 reference range



                                                                 was not used to



                                                                 interpret this



                                                                 result as



                                                                 normal/abnormal

.

 

             UA PH DIPSTICK (test 6.0 pH UNITS 4.6-8.0                   



             code = CARLEEN)                                         

 

             UA PROTEIN DIPSTICK NEGATIVE (0) See_Comment                [Automa

fredrick



             (test code = PROU) mg/dL                                  message] 

The



                                                                 system which



                                                                 generated this



                                                                 result transmit

fredrick



                                                                 reference range

:



                                                                 <30 (1+). The



                                                                 reference range



                                                                 was not used to



                                                                 interpret this



                                                                 result as



                                                                 normal/abnormal

.

 

             UA UROBILINIOGEN NORMAL (0)   See_Comment                [Automated



             DIPSTICK (test code = mg/Dl                                  messag

e] The



             URO)                                                system which



                                                                 generated this



                                                                 result transmit

fredrick



                                                                 reference range

:



                                                                 <2.0 (1+). The



                                                                 reference range



                                                                 was not used to



                                                                 interpret this



                                                                 result as



                                                                 normal/abnormal

.

 

             UA NITRITE DIPSTICK NEGATIVE (0) NEG                       



             (test code = PAUL) SCREEN                                 

 

             UA LEUKOCYTE ESTERASE NEGATIVE (0) See_Comment                [Auto

mated



             DIPSTICK (test code = Leuk/mcL                               messag

e] The



             LE[facility])                                               system which



                                                                 generated this



                                                                 result transmit

fredrick



                                                                 reference range

:



                                                                 (NEG) 0. The



                                                                 reference range



                                                                 was not used to



                                                                 interpret this



                                                                 result as



                                                                 normal/abnormal

.

 

             UA COMMENT (test code CLEAN CATCH  SpecComment               



             = COMU)      SPEC NoteSPEC                           

 

             UA WBC (test code = 5-10 #WBC/HPF 0-3          A            



             WBCU)                                               

 

             UA RBC (test code = 0-3 #RBC/HPF 0-3                       



             RBCU)                                               

 

             UA BACTERIA (test MODERATE >5  NONE-FEW     A            



             code = BACU) /HPF                                   

 

             UA SQUAMOUS CELLS FEW >2 /UL   NONE-SQepi                



             (test code = SQU)                                        

 

             UA CULTURE NEEDED? Crit NOTmet  Cult byWBC                



             (test code = UACULT) CULT-N/A                               



                          Criteria                               



Indication for culture: Dysuria/FrequencyUR HCG [id number] 23:05:00





             Test Item    Value        Reference Range Interpretation Comments

 

             UR HCG QUAL (test NEGATIVE     NEG                       Very dilut

e urines with a



             code = HCGQLU)                                        low specific 

gravity may



                                                                 notcontain repr

esentative



                                                                 levels of hCG.



Indication for culture: Dysuria/Frequency- HEPA IMAG INCL GB W [id number] 
11:12:00************************************************************[facility] : 1980 Sex: 
F************************************************************Patient Name: 
FAMILIA WILSON  Unit No: [id number] EXAMS: CPT: [id number] HEPA IMAG INCL GB 
W PHA 50763 NUCLEAR MEDICINE HIDA SCAN WITH GALLBLADDER EJECTION FRACTION 
HISTORY: RIGHT UPPER QUADRANT PAIN RADIOPHARMACEUTICAL: 5 mCi Tc-99m Choletec IV
FINDINGS:  After the administration of tracer, relatively prompt hepatic uptake 
is seen. This is followed by biliary excretion and subsequent passage of tracer 
into the gallbladder and small bowel. The patient was given 2.18 micrograms of 
Kinevac intravenously. The patient did not experience reproducible symptoms with
Kinevac administration. The gallbladder ejection fraction is 89.9%. The normal 
range for gallbladder ejection fraction is 30% or greater. IMPRESSION: 1. No 
scintigraphic evidence of acute cholecystitis. 2. The gallbladder ejection 
fraction is 89.9 %. ** Electronically Signed by Tj Bledsoe MD ** ** on 
2020 at 1112  ** Reported and signed by: Tj Bledsoe MD CC: Adi Benjamin DO; Edil Martinez MD  Technologist: Roly LOVELACE [facility] Dt/Tm: 
2020 (1112) by:[email or web address] Orig Print D/T: S: 2020 (111) BATCH NO: 
N/A Name: FAMILIA WILSON [facility] Phys: Edil Bowman MD 
[address] [facility] : 1980 Age: 39 Sex: F [place] [address] Acct No: 
[id number] Loc: N.NUC  Exam Date: 2020 Status: REG CLI PH: FAX: PAGE 1 
Signed ReportETHANOL2020-10-04 04:17:00





             Test Item    Value        Reference Range Interpretation Comments

 

             ETHANOL (BEAKER) (test code = 400) < mg/dL      <=10               

       



 ID - [id number] LEVEL2020-10-04 04:09:00





             Test Item    Value        Reference Range Interpretation Comments

 

             SALICYLATE LEVEL (BEAKER) (test code < mg/dL      20.0-30.0    L   

         



             = 764)                                              



 ID - [id number] LEVEL2020-10-04 04:09:00





             Test Item    Value        Reference Range Interpretation Comments

 

             ACETAMINOPHEN LEVEL (BEAKER) (test < ug/mL      10.0-30.0    L     

       



             code = 344)                                         



 ID - [id number] DRUG SCREEN, URINE2020-10-04 04:09:00





             Test Item    Value        Reference Range Interpretation Comments

 

             BARBITURATE URINE (BEAKER) (test Negative     Negative             

     



             code = 725)                                         

 

             BENZODIAZEPINE SCREEN URINE (BEAKER) Negative     Negative         

         



             (test code = 726)                                        

 

             COCAINE (METAB.) SCREEN (BEAKER) Negative     Negative             

     



             (test code = 1164)                                        

 

             METHADONE SCREEN (BEAKER) (test code Negative     Negative         

         



             = 1436)                                             

 

             OPIATE SCREEN URINE (BEAKER) (test Negative     Negative           

       



             code = 734)                                         

 

             CANNABINOID SCREEN URINE (BEAKER) Positive     Negative     A      

      



             (test code = 727)                                        

 

             AMPH/METHAMPH SCREEN (BEAKER) (test Negative     Negative          

        



             code = 1438)                                        

 

             PHENCYCLIDINE SCREEN URINE (BEAKER) Negative     Negative          

        



             (test code = 608)                                        

 

             PH UA (BEAKER) (test code = 467) 6.0          5.0-8.0              

     



DRUG CUTOFF CONC.Cocaine 300 ng/mL Cannabinoid 50 ng/mLBenzodiazepine 200 
ng/mLBarbiturate 200 ng/mLPhencyclidine 25 ng/mLOpiate 300 ng/mLMethadone 300 
ng/mLAmphetamine/ 1000 ng/mL MethamphetamineThis assay provides an unconfirmed 
qualitative test result for the clinical management of patients in emergency 
situations. Chain of custody not maintained. Some over-the-counter medications, 
as well as adulterants, may cause inaccurate results. Clinical correlation 
should be applied. A more comprehensive drug screen or confirmation of a 
detected drug may be performed upon request. ID - [id number] 
METABOLIC PANEL2020-10-04 04:00:00





             Test Item    Value        Reference Range Interpretation Comments

 

             SODIUM (BEAKER) 137 meq/L    135-148                   



             (test code = 381)                                        

 

             POTASSIUM (BEAKER) 3.3 meq/L    3.5-5.5      L            



             (test code = 379)                                        

 

             CHLORIDE (BEAKER) 103 meq/L                        



             (test code = 382)                                        

 

             CO2 (BEAKER) (test 20 meq/L     20-31                     



             code = 355)                                         

 

             BLOOD UREA NITROGEN 4 mg/dL      10-26        L            



             (BEAKER) (test code                                        



             = 354)                                              

 

             CREATININE (BEAKER) 0.82 mg/dL   0.50-1.20                 



             (test code = 358)                                        

 

             GLUCOSE RANDOM 103 mg/dL                        



             (BEAKER) (test code                                        



             = 652)                                              

 

             CALCIUM (BEAKER) 9.3 mg/dL    8.5-10.5                  



             (test code = 697)                                        

 

             EGFR (BEAKER) (test 78 mL/min/1.73                           ESTIMA

FREDRICK GFR IS



             code = 1092) sq m                                   NOT AS ACCURATE

 AS



                                                                 CREATININE



                                                                 CLEARANCE IN



                                                                 PREDICTING



                                                                 GLOMERULAR



                                                                 FILTRATION RATE

.



                                                                 ESTIMATED GFR I

S



                                                                 NOT APPLICABLE 

FOR



                                                                 DIALYSIS PATIEN

TS.



 ID - [id number], SERUM, [id number] 03:53:00





             Test Item    Value        Reference Range Interpretation Comments

 

             PREGNANCY TEST SERUM (BEAKER) (test Negative                       

        



             code = 584)                                         



URINE [id number] 07:42:00





             Test Item    Value        Reference Range Interpretation Comments

 

             CULTURE (BEAKER)                           A            80-89,000 c

ol/mL



             (test code = 1095)                                        Beta-hemo

lytic



                                                                 streptococcus g

roup B, by



                                                                 serological liam

upingIf this



                                                                 patient is preg

nant, please



                                                                 refer to ACOG g

uidelines



                                                                 for appropriate

 screening



                                                                 and management 

of colonized



                                                                 pregnant women.



&gt;100,000 col/mL skin floraCT, [id number] 12:54:00Reason for exam:-
&gt;RLQ painWhat is the patient's sedation requirement?-&gt;No SedationFINAL 
REPORT PATIENT ID: [id number] ABDOMINAL AND PELVIS CT DATED 2020 CLINICAL 
INFORMATION: RLQ painRLQ pain TECHNIQUE: Axial images of the abdomen and pelvis 
were obtained from diaphragm to the pubic symphysis with intravenous contrast. 
This exam was performed according to our departmental dose-optimization program,
which includes automated exposure control, adjustment of the mA and/or kV 
according to patient size and/or use of interactive reconstruction technique. 
COMMENT: Liver and spleen arenormal in size without focal abnormality. There is 
diffusely decreased attenuation liver consistent with fatty hepatic infiltrate. 
Gallbladder is contracted. Sludge is seen in the gallbladder. No gallstone or 
biliary dilatation is noted. Pancreas and adrenals are unremarkable. Both 
kidneys are normal in size and functioning. No hydronephrosis, hydroureter, 
urolithiasis is seen. The small and large bowel are unremarkable. Appendix is 
not visualized. No ventral or inguinal hernia is seen. Uterus and ovaries are 
unremarkable. No mass, adenopathy or ascites is present. IMPRESSION: 1. Fatty 
liver.2. Unable to visualize the appendix.3. No hydronephrosis, hydroureter, or 
urolithiasis. Signed: Jennifer TerrellMDReport Verified Date/Time: 2020 
12:54:09 Reading Location: [facility] Consult Reading Room 
Electronically signed by: JENNIFER TERRELL M.D. on 2020 12:54 PMRAPID DRUG 
SCREEN, [id number] 12:32:00





             Test Item    Value        Reference Range Interpretation Comments

 

             BARBITURATE URINE (BEAKER) (test Positive     Negative     A       

     



             code = 725)                                         

 

             BENZODIAZEPINE SCREEN URINE (BEAKER) Negative     Negative         

         



             (test code = 726)                                        

 

             COCAINE (METAB.) SCREEN (BEAKER) Negative     Negative             

     



             (test code = 1164)                                        

 

             METHADONE SCREEN (BEAKER) (test code Negative     Negative         

         



             = 1436)                                             

 

             OPIATE SCREEN URINE (BEAKER) (test Positive     Negative     A     

       



             code = 734)                                         

 

             CANNABINOID SCREEN URINE (BEAKER) Positive     Negative     A      

      



             (test code = 727)                                        

 

             AMPH/METHAMPH SCREEN (BEAKER) (test Negative     Negative          

        



             code = 1438)                                        

 

             PHENCYCLIDINE SCREEN URINE (BEAKER) Negative     Negative          

        



             (test code = 608)                                        

 

             PH UA (BEAKER) (test code = 467) 5.0          5.0-8.0              

     



DRUG CUTOFF CONC.Cocaine 300 ng/mL Cannabinoid 50 ng/mLBenzodiazepine 200 
ng/mLBarbiturate 200 ng/mLPhencyclidine 25 ng/mLOpiate 300 ng/mLMethadone 300 
ng/mLAmphetamine/ 1000 ng/mL MethamphetamineThis assay provides an unconfirmed 
qualitative test result for the clinical management of patients in emergency 
situations. Chain of custody not maintained. Some over-the-counter medications, 
as well as adulterants, may cause inaccurate results. Clinical correlation 
should be applied. A more comprehensive drug screen or confirmation of a 
detected drug may be performed upon request. ID - [id number], PELVIS, 
WITH ENDOVAG AND [id number] 12:31:00Reason for exam:-&gt;ABDOMINAL PAIN
FINAL REPORT PATIENT ID: [id number] Pelvic ultrasound dated 2020 Comment: 
Real-time transpelvic and transvaginal ultrasound were performed. Endovaginal 
ultrasound was performed to better evaluate adnexa and ovaries. The uterus 
measures 7.0 x 3.3 x 3.9 cm. Endometrial measures 0.4 cm in thickness. Both 
endometrium and myometrium are unremarkable. Right ovary measures 2.8 x 1.8 x 
1.4 cm. Left ovary measures 2.6 x 1.8 x 2.2 cm. No mass lesion is seen in the 
adnexa. No free fluid is seen in the cul-de-sac. Impression: Unremarkable pelvic
ultrasound. Signed: Jennifer Terrell MDReport Verified Date/Time: 2020 12:31:40
Reading Location: [facility] Consult Reading Room Electronically signed 
by: JENNIFER TERRELL M.D. on 2020 12:31 PMCOMPREHENSIVE METABOLIC PANEL
2020 12:11:00





             Test Item    Value        Reference Range Interpretation Comments

 

             TOTAL PROTEIN 9.0 gm/dL    6.0-8.5      H            Specimen moder

ately



             (BEAKER) (test code =                                        hemoly

zed



             770)                                                

 

             ALBUMIN (BEAKER) 4.6 g/dL     3.5-5.0                   Specimen mo

derately



             (test code = 1145)                                        hemolyzed

 

             ALKALINE PHOSPHATASE 85 U/L                           



             (BEAKER) (test code =                                        



             346)                                                

 

             BILIRUBIN TOTAL 0.4 mg/dL    0.1-1.3                   Specimen mod

erately



             (BEAKER) (test code =                                        hemoly

zed



             377)                                                

 

             SODIUM (BEAKER) (test 139 meq/L    135-148                   



             code = 381)                                         

 

             POTASSIUM (BEAKER) 4.0 meq/L    3.5-5.5                   Specimen 

moderately



             (test code = 379)                                        hemolyzed

 

             CHLORIDE (BEAKER) 104 meq/L                        



             (test code = 382)                                        

 

             CO2 (BEAKER) (test 17 meq/L     20-31        L            



             code = 355)                                         

 

             BLOOD UREA NITROGEN 9 mg/dL      10-26        L            



             (BEAKER) (test code =                                        



             354)                                                

 

             CREATININE (BEAKER) 0.85 mg/dL   0.50-1.20                 Specimen

 moderately



             (test code = 358)                                        hemolyzed

 

             GLUCOSE RANDOM 139 mg/dL           H            



             (BEAKER) (test code =                                        



             652)                                                

 

             CALCIUM (BEAKER) 9.9 mg/dL    8.5-10.5                  



             (test code = 697)                                        

 

             AST (SGOT) (BEAKER) 70 U/L       5-40         H            Specimen

 moderately



             (test code = 353)                                        hemolyzed

 

             ALT (SGPT) (BEAKER) 43 U/L       6-50                      Specimen

 moderately



             (test code = 347)                                        hemolyzed

 

             EGFR (BEAKER) (test 75 mL/min/1.73                           ESTIMA

FREDRICK GFR IS



             code = 1092) sq m                                   NOT AS ACCURATE

 AS



                                                                 CREATININE



                                                                 CLEARANCE IN



                                                                 PREDICTING



                                                                 GLOMERULAR



                                                                 FILTRATION RATE

.



                                                                 ESTIMATED GFR I

S



                                                                 NOT APPLICABLE 

FOR



                                                                 DIALYSIS PATIEN

TS.



 ID - [id number] 12:10:00





             Test Item    Value        Reference Range Interpretation Comments

 

             LIPASE (BEAKER) (test code = 749) 10 U/L       8-78                

      



 ID - [id number]/ [id number] 12:00:00





             Test Item    Value        Reference Range Interpretation Comments

 

             COLOR (BEAKER) (test code = 470) [place]                             

     

 

             CLARITY (BEAKER) (test code = 469) Cloudy                          

       

 

             SPECIFIC GRAVITY UA (BEAKER) (test 1.029        1.001-1.035        

       



             code = 468)                                         

 

             PH UA (BEAKER) (test code = 467) 5.0          5.0-8.0              

     

 

             PROTEIN UA (BEAKER) (test code = 100 mg/dL    Negative     A       

     



             464)                                                

 

             GLUCOSE UA (BEAKER) (test code = Negative     Negative             

     



             365)                                                

 

             KETONES UA (BEAKER) (test code = 20 mg/dL     Negative     A       

     



             371)                                                

 

             BILIRUBIN UA (BEAKER) (test code = Positive     Negative     A     

       



             462)                                                

 

             BLOOD UA (BEAKER) (test code = 461) Small        Negative     A    

        

 

             NITRITE UA (BEAKER) (test code = Negative     Negative             

     



             465)                                                

 

             LEUKOCYTE ESTERASE UA (BEAKER) Small        Negative     A         

   



             (test code = 466)                                        

 

             UROBILINOGEN UA (BEAKER) (test code 2.0 mg/dL    0.2-1.0      H    

        



             = 463)                                              

 

             RBC UA (BEAKER) (test code = 519) 4 /HPF                           

      

 

             WBC UA (BEAKER) (test code = 520) 16 /HPF                          

      

 

             BACTERIA (BEAKER) (test code = 517) Moderate                       

        

 

             MUCUS (BEAKER) (test code = 1574) Many                             

      

 

             SQUAMOUS EPITHELIAL (BEAKER) (test 20 /HPF                         

       



             code = 516)                                         

 

             HYALINE CASTS (BEAKER) (test code = 84 /LPF                        

        



             514)                                                

 

             SOURCE(BEAKER) (test code = 2795)                                  

      



 ID - [auto] ID - techCBC W/PLT COUNT &amp; AUTO DIFFERENTIAL
2020 11:59:00





             Test Item    Value        Reference Range Interpretation Comments

 

             WHITE BLOOD CELL COUNT (BEAKER) 10.7 K/ L    4.0-10.0     H        

    



             (test code = 775)                                        

 

             RED BLOOD CELL COUNT (BEAKER) 5.29 M/ L    4.00-5.00    H          

  



             (test code = 761)                                        

 

             HEMOGLOBIN (BEAKER) (test code = 15.0 GM/DL   12.0-15.5            

     



             410)                                                

 

             HEMATOCRIT (BEAKER) (test code = 44.7 %       36.0-46.0            

     



             411)                                                

 

             MEAN CORPUSCULAR VOLUME (BEAKER) 84.5 fL      82.0-99.0            

     



             (test code = 753)                                        

 

             MEAN CORPUSCULAR HEMOGLOBIN 28.4 pg      27.0-33.0                 



             (BEAKER) (test code = 751)                                        

 

             MEAN CORPUSCULAR HEMOGLOBIN CONC 33.6 GM/DL   32.0-36.0            

     



             (BEAKER) (test code = 752)                                        

 

             RED CELL DISTRIBUTION WIDTH 13.6 %       12.0-15.0                 



             (BEAKER) (test code = 412)                                        

 

             PLATELET COUNT (BEAKER) (test 239 K/CU MM  150-430                 

  



             code = 756)                                         

 

             MEAN PLATELET VOLUME (BEAKER) 11.4 fL      6.0-11.5                

  



             (test code = 754)                                        

 

             NUCLEATED RED BLOOD CELLS 0 /100 WBC   0-0                       



             (BEAKER) (test code = 413)                                        

 

             NEUTROPHILS RELATIVE PERCENT 67 %                                  

 



             (BEAKER) (test code = 429)                                        

 

             LYMPHOCYTES RELATIVE PERCENT 25 %                                  

 



             (BEAKER) (test code = 430)                                        

 

             MONOCYTES RELATIVE PERCENT 7 %                                    



             (BEAKER) (test code = 431)                                        

 

             EOSINOPHILS RELATIVE PERCENT 0 %                                   

 



             (BEAKER) (test code = 432)                                        

 

             BASOPHILS RELATIVE PERCENT 0 %                                    



             (BEAKER) (test code = 437)                                        

 

             NEUTROPHILS ABSOLUTE COUNT 7.16 K/ L    1.80-8.00                 



             (BEAKER) (test code = 670)                                        

 

             LYMPHOCYTES ABSOLUTE COUNT 2.68 K/ L    1.48-4.50                 



             (BEAKER) (test code = 414)                                        

 

             MONOCYTES ABSOLUTE COUNT (BEAKER) 0.79 K/ L    0.00-1.30           

      



             (test code = 415)                                        

 

             EOSINOPHILS ABSOLUTE COUNT 0.01 K/ L    0.00-0.50                 



             (BEAKER) (test code = 416)                                        

 

             BASOPHILS ABSOLUTE COUNT (BEAKER) 0.04 K/ L    0.00-0.20           

      



             (test code = 417)                                        

 

             IMMATURE GRANULOCYTES-RELATIVE 0 %          0-0                    

   



             PERCENT (BEAKER) (test code =                                      

  



             2801)                                               



PREGNANCY SCREEN, [id number] 11:54:00





             Test Item    Value        Reference Range Interpretation Comments

 

             PREGNANCY TEST URINE (BEAKER) (test Negative                       

        



             code = 583)                                         



- CT ABD PELVIS [id number] 03:33:00 FAX: Shelley Sykes 
[phone] [place]:  St: REG-----------------------------------------
-------------------------------------- [facility] 
: 1980 Age/S:39/F [address] Unit: [id number] Loc: [place] [address] Phys: Shelley Amato NPAcct: [id number] Dis Date: Status: 
REG ER PHONE #: [phone] Exam Date: 2020 0315 FAX #: [phone] 
Reason: DIFFUSE ABD PAIN EXAMS: CPT CODE: [id number] CT ABD PELVIS W/CONT  26614 
EXAM: - CT ABD PELVIS W/CONT LOCATION: H61 CLINICAL HISTORY/INDICATION: DIFFUSE 
ABD PAIN COMPARISON: CT 2020  TECHNIQUE: Axial CT images of the abdomen and
 pelvis were obtained from the diaphragm to the lesser trochanter with IV 
contrast administration. Coronal and sagittal reformations were reconstructed
from the axial data set. Postcontrast images were acquired in the portal venous 
phase This examination was performed according to our departmental dose 
optimization program, which includes automated exposure control, adjustment of 
the mA and/or kV according to patient size, and/or use of iterative cipriano
nstruction technique. FINDINGS: LOWER THORAX: Clear. LIVER: Diffuse fatty 
infiltration the liver. Ill-defined isodense or hypodense 1.8 cm lesion in the 
left hepatic lobe adjacent to the falciform ligament is similar and may reflect 
focal fatty infiltration. The liver otherwise is unremarkable. GALLBLA
DDER/BILIARY SYSTEM: Normal CT appearance of the gallbladder. No common duct 
dilatation. PANCREAS: Unremarkable. SPLEEN: No splenomegaly or focal lesions. 
ADRENALS: No adrenal nodules.  KIDNEYS/URETERS: No hydronephrosis, stones, or 
solid mass lesions. Small bilateral renal cysts are similar. VESSELS:No AAA. 
Patent portal vein. LYMPH NODES: No lymphadenopathy. PERITONEUM / 
RETROPERITONEUM: No free air or fluid. PAGE 1 Signed Report (CONTINUED) FAX: Shelley Sykes [phone] [place]:  St: 
REG-----------------------------------------------------------------------------

-- Name: FAMILIA WILSON  : 1980 Age/S: 39/F [address] Unit: [id number] Loc: [place] [address] Phys: Shelley Amato NP Acct: [id number] Dis Date: Status: REG ER PHONE #: [phone] 
Exam Date: 2020  FAX #: [phone] Reason: DIFFUSE ABD PAIN EXAMS: 
CPT CODE: [id number] CT ABD PELVIS W/CONT 63691 &lt;Continued&gt; GI TRACT: Small
 bowel and colon are not dilated. No bowel wall thickening. The appendix is 
visualized on image 47 of series 301 and appears normal.GENITOURINARY ORGANS: 
Unremarkable uterus and ovaries. PELVIC FREE FLUID/FLUID COLLECTION: None. URIN
CONSTANCE BLADDER: Unremarkable. EXTERNAL SOFT TISSUE: No abnormalities. BONES: 
Regional osseous structures are intact. IMPRESSION: 1. No acute findings 
demonstrated in the abdomen and pelvis. 2. Fatty infiltration of the liver. ** 
Electronically Signed by Jayy Ann MD on 2020 at 0333 ** Reported and 
signed by: Jayy Ann MD CC: Shelley Amato NP  Technologist: 
Tammie BRAVO Trnscrd Dt/Tm: 2020 (0333) t.SDR.TH15 Orig
 Print D/T: S: 2020 (0336  PAGE 2 Signed ReportBASIC METABOLIC PANEL
2020 03:06:00





             Test Item    Value        Reference Range Interpretation Comments

 

             SODIUM (test code = 138 mmol/L   137-145      N            



             NA)                                                 

 

             POTASSIUM (test code 3.8 mmol/L   3.4-5.0      N            



             = K)                                                

 

             CHLORIDE (test code = 104 mmol/L          N            



             CL)                                                 

 

             CARBON DIOXIDE (test 22 mmol/L    22-30        N            



             code = CO2)                                         

 

             GLUCOSE (test code = 152 mg/dL           H            



             GLU)                                                

 

             BLOOD UREA NITROGEN 10 mg/dL     7-17         N            



             (test code = BUN)                                        

 

             GLOMERULAR FILTRATION 100          >60                       The es

timated



             RATE (test code =                                        glomerular

 filtration



             GFR)                                                rate is compute

d



                                                                 usingpatient ra

ce, age



                                                                 (>18), sex, and

 serum



                                                                 creatinine. If 

anyof



                                                                 the needed data



                                                                 elements are mi

ssing



                                                                 the Laboratory 

cannot



                                                                 compute an saige

mation



                                                                 of the glomerul

ar



                                                                 filtration rate

.

 

             CREATININE (test code 0.7 mg/dL    0.5-1.0      N            



             = CREAT)                                            

 

             CALCIUM (test code = 9.7 mg/dL    8.4-10.2     N            



             CA)                                                 



LIVER FUNCTION [id number] 03:06:00





             Test Item    Value        Reference Range Interpretation Comments

 

             TOTAL PROTEIN (test 8.0 g/dL     6.3-8.2      N            



             code = PROT)                                        

 

             ALBUMIN (test code = 4.5 g/dL     3.5-5.0      N            



             ALB)                                                

 

             BILIRUBIN TOTAL (test 0.5 mg/dL    0.2-1.3      N            



             code = BILT)                                        

 

             BILIRUBIN CONJUGATED 0 mg/dL      0-0.3        N            ~~~~~~~

~~~~~~~~~~~~~~



             (test code = BILCON)                                        ~~~~~~~

~~~~~~~~~~~~~~



                                                                 ~~~~~~~~~~~~~~~

~~~CON



                                                                 JUGATED BILIRUB

IN IS



                                                                 THE REPLACEMENT

 ASSAY



                                                                 FOR



                                                                 DIRECTBILIRUBIN

.~~~~~



                                                                 ~~~~~~~~~~~~~~~

~~~~~~



                                                                 ~~~~~~~~~~~~~~~

~~~~~~



                                                                 ~~~~~~~~~~~~~

 

             BILIRUBIN UNCONJUGATED 0.2 mg/dL    0-1.1        N            



             (test code = BILUNC)                                        

 

             SGOT/AST (test code = 68 U/L       15-46        H            



             AST)                                                

 

             SGPT/ALT (test code = 38 U/L       13-69        N            



             ALT)                                                

 

             ALKALINE PHOSPHATASE 88 U/L              N            



             (test code = ALKP)                                        



[id number] 03:06:00





             Test Item    Value        Reference Range Interpretation Comments

 

             LIPASE (test code = LIP) 48 U/L              N            



HCG SERUM [id number] 03:06:00





             Test Item    Value        Reference Range Interpretation Comments

 

             HCG SERUM QUAL (test code = HCGQL) NEGATIVE     NEGATIVE           

       



BASIC METABOLIC [id number] 02:55:00





             Test Item    Value        Reference Range Interpretation Comments

 

             SODIUM (test code = 138 mmol/L   137-145      N            



             NA)                                                 

 

             POTASSIUM (test code 3.8 mmol/L   3.4-5.0      N            



             = K)                                                

 

             CHLORIDE (test code = 104 mmol/L          N            



             CL)                                                 

 

             CARBON DIOXIDE (test 22 mmol/L    22-30        N            



             code = CO2)                                         

 

             GLUCOSE (test code = 152 mg/dL           H            



             GLU)                                                

 

             BLOOD UREA NITROGEN 10 mg/dL     7-17         N            



             (test code = BUN)                                        

 

             GLOMERULAR FILTRATION 100          >60                       The es

timated



             RATE (test code =                                        glomerular

 filtration



             GFR)                                                rate is compute

d



                                                                 usingpatient ra

ce, age



                                                                 (>18), sex, and

 serum



                                                                 creatinine. If 

anyof



                                                                 the needed data



                                                                 elements are mi

ssing



                                                                 the Laboratory 

cannot



                                                                 compute an saige

mation



                                                                 of the glomerul

ar



                                                                 filtration rate

.

 

             CREATININE (test code 0.7 mg/dL    0.5-1.0      N            



             = CREAT)                                            

 

             CALCIUM (test code = 9.7 mg/dL    8.4-10.2     N            



             CA)                                                 



LIVER FUNCTION [id number] 02:55:00





             Test Item    Value        Reference Range Interpretation Comments

 

             TOTAL PROTEIN (test 8.0 g/dL     6.3-8.2      N            



             code = PROT)                                        

 

             ALBUMIN (test code = 4.5 g/dL     3.5-5.0      N            



             ALB)                                                

 

             BILIRUBIN TOTAL (test 0.5 mg/dL    0.2-1.3      N            



             code = BILT)                                        

 

             BILIRUBIN CONJUGATED 0 mg/dL      0-0.3        N            ~~~~~~~

~~~~~~~~~~~~~~



             (test code = BILCON)                                        ~~~~~~~

~~~~~~~~~~~~~~



                                                                 ~~~~~~~~~~~~~~~

~~~CON



                                                                 JUGATED BILIRUB

IN IS



                                                                 THE REPLACEMENT

 ASSAY



                                                                 FOR



                                                                 DIRECTBILIRUBIN

.~~~~~



                                                                 ~~~~~~~~~~~~~~~

~~~~~~



                                                                 ~~~~~~~~~~~~~~~

~~~~~~



                                                                 ~~~~~~~~~~~~~

 

             BILIRUBIN UNCONJUGATED 0.2 mg/dL    0-1.1        N            



             (test code = BILUNC)                                        

 

             SGOT/AST (test code = 68 U/L       15-46        H            



             AST)                                                

 

             SGPT/ALT (test code = 38 U/L       13-69        N            



             ALT)                                                

 

             ALKALINE PHOSPHATASE 88 U/L              N            



             (test code = ALKP)                                        



[id number] 02:55:00





             Test Item    Value        Reference Range Interpretation Comments

 

             LIPASE (test code = LIP) 48 U/L              N            



HCG SERUM [id number] 02:55:00





             Test Item    Value        Reference Range Interpretation Comments

 

             HCG SERUM QUAL (test code = HCGQL)              NEGATIVE           

       



CBC W/AUTO [id number] 02:34:00





             Test Item    Value        Reference Range Interpretation Comments

 

             WHITE BLOOD CELL (test code = 10.2 x10 3/uL 5.0-12.0     N         

   



             WBC)                                                

 

             RED BLOOD CELL (test code = 4.92 x10 6/uL 4.20-5.40    N           

 



             RBC)                                                

 

             HEMOGLOBIN (test code = HGB) 14.3 g/dL    12.0-16.0    N           

 

 

             HEMATOCRIT (test code = HCT) 43.1 %       36.0-46.0    N           

 

 

             MEAN CELL VOLUME (test code = 88 fL        81-99        N          

  



             MCV)                                                

 

             MEAN CELL HGB (test code = MCH) 29.1 pg      27-31        N        

    

 

             MEAN CELL HGB CONCENTRATION 33.2 g/dL    33-37        N            



             (test code = MCHC)                                        

 

             RED CELL DISTRIBUTION WIDTH 13.6 %       11.5-15.5    N            



             (test code = RDW)                                        

 

             PLATELET COUNT (test code = 248 x10 3/uL 130-400      N            



             PLT)                                                

 

             MEAN PLATELET VOLUME (test code 11.2 fL      9.4-16.4     N        

    



             = MPV)                                              

 

             NEUTROPHIL % (test code = NT%) 72.6 %       43-65        H         

   

 

             IMMATURE GRANULOCYTE % (test 0.3 %        0.0-2.0      N           

 



             code = IG%)                                         

 

             LYMPHOCYTE % (test code = LY%) 22.1 %       20.5-45.5    N         

   

 

             MONOCYTE % (test code = MO%) 4.6 %        5.5-11.7     L           

 

 

             EOSINOPHIL % (test code = EO%) 0.1 %        0.9-2.9      L         

   

 

             BASOPHIL % (test code = BA%) 0.3 %        0.2-1.0      N           

 

 

             NUCLEATED RBC % (test code = 0.0 %        0-1.0        N           

 



             NRBC%)                                              

 

             NEUTROPHIL # (test code = NT#) 7.40 x10 3/uL 2.2-4.8      H        

    

 

             IMMATURE GRANULOCYTE # (test 0.03 x10 3/uL 0-0.03       N          

  



             code = IG#)                                         

 

             LYMPHOCYTE # (test code = LY#) 2.25 x10 3/uL 1.3-2.9      N        

    

 

             MONOCYTE # (test code = MO#) 0.47 x10 3/uL 0.3-0.8      N          

  

 

             EOSINOPHIL # (test code = EO#) 0.01 x10 3/uL 0.0-0.2      N        

    

 

             BASOPHIL # (test code = BA#) 0.03 x10 3/uL 0.0-0.1      N          

  



EMERGENCY ROOM [id number] 01:22:00





             Test Item    Value        Reference Range Interpretation Comments

 

             DIAGNOSIS (test code = SPECIMENS RCVED RECEIVED                  



             DIAG)        SPECIMEN                               



UA RFLX MICR CULT IF [id number] 00:46:00





             Test Item    Value        Reference Range Interpretation Comments

 

             UA COLOR (test code = STRAW DESCRIPT YELLOW                    



             COLU)                                               

 

             UA APPEARANCE (test code CLEAR DESCRIPT CLEAR                     



             = APPU)                                             

 

             UA GLUCOSE DIPSTICK (test NEGATIVE (0) mg/dL (NEG) 0               

    



             code = DGLUU)                                        

 

             UA BILIRUBIN DIPSTICK NEGATIVE (0.0) mg/dL (NEG) 0                 

  



             (test code = BILU)                                        

 

             UA KETONE DIPSTICK (test 0 (NEG) mg/dL (NEG) 0                   



             code = KETU)                                        

 

             UA SPECIFIC GRAVITY (test 1.035 SG     1.001-1.035               



             code = SGU)                                         

 

             UA BLOOD DIPSTICK (test NEGATIVE (0.00) (NEG) 0                   



             code = FREDDIE)  mg/dL                                  

 

             UA PH DIPSTICK (test code 8.0 pH UNITS 4.6-8.0                   



             = CARLEEN)                                              

 

             UA PROTEIN DIPSTICK (test NEGATIVE (0) mg/dL <30 (1+)              

    



             code = PROU)                                        

 

             UA UROBILINIOGEN DIPSTICK NORMAL (0) mg/dL <2.0 (1+)               

  



             (test code = URO)                                        

 

             UA NITRITE DIPSTICK (test NEGATIVE (0) SCREEN NEG                  

     



             code = PAUL)                                        

 

             UA LEUKOCYTE ESTERASE NEGATIVE (0) (NEG) 0                   



             DIPSTICK (test code = Leuk/mcL                               



             LEUU)                                               

 

             UA COMMENT (test code = CLEAN CATCH SPEC SpecComment               



             COMU)        Notes                                  

 

             UA WBC (test code = WBCU) 0-3 #WBC/HPF 0-3                       

 

             UA RBC (test code = RBCU) 0-3 #RBC/HPF 0-3                       

 

             UA SQUAMOUS CELLS (test RARE >0 /HPF NONE-SQepi                



             code = SQU)                                         

 

             UA MUCUS (test code = RARE /LPF    NONE                      



             MUCU)                                               

 

             UA CULTURE NEEDED? (test Crit NOTmet CULT-N/A Cult byWBC           

     



             code = UACULT) Criteria                               



Specimen comments: ccIndication for culture: Suprapubic Pain- CT ABD PELVIS 
[id number] 23:12:00 Patient Name: FAMILIA BAUER Unit No: [id number] 
EXAMS:  CPT CODE: [id number] CT ABD PELVIS W/CONT 99633 EXAM: - CT ABD PELVIS 
W/CONT LOCATION: H57 HISTORY: 39 years-year old Female with abd pain TECHNIQUE: 
IV contrast was given, no oral contrast was given. Portal venous phase - 
abdomen. No delayed phase images were obtained.. Reconstructions - coronal and 
sagittal planes. Automated exposure reduction (Auto mA/Smart mA) was utilized in
 compliance with ACR [employer]. COMPARISON: 2/10/2013 FINDINGS: 
Hepatobiliary: The liver is normal without focal lesion. The gallbladder is 
normal. No biliary dilation. Pancreas: Normal.  Spleen: Normal. Adrenals: 
Normal. Genitourinary: The kidneys are normal. No hydronephrosis. The bladder is
 incompletely distended, limiting evaluation. Unremarkable uterus. No adnexal 
lesions. Gastrointestinal: No bowel obstruction or perienteric inflammation. The
 appendix is normal. Lymphatics: No enlarged lymph nodes by CT size criteria. 
Vascular: The aorta is normal in appearance. No evidence of aneurysm or 
dissection. Bones/Soft Tissues: No acute osseous findings. No ventral hernias. 
Peritoneum/Other: No free air. No free fluid. Thoracic: Included images of the 
lower chest demonstrate no abnormalities.  IMPRESSION: No acute findings in the 
abdomen/pelvis. ** Electronically Signed by Roly Begum MD on 2020 at 
2312 **  Reported and signed by: Roly Begum MD [facility] NAME: FAMILIA BAUER [address] [place], [place] [address] PHYS: Tisha [place], [place] 
[address] : 1980 AGE: 39 SEX: F ACCT NO: [id number] LOC: B.ERS PHONE #: 
[phone] EXAM DATE: 2020 STATUS: PRE ER FAX #: [phone] RAD #: D/C
 DT PAGE 1 Signed Report (CONTINUED) Patient Name: FAMILIA BAUER Unit No: 
[id number] EXAMS: CPT CODE: [id number] CT ABD PELVIS W/CONT 97601  &
lt;Continued&gt; CC: Tisha JAVIER  Dictated Date/Time: 2020 () 
Technologist: Sanjuanita Mccabe CTDI: 12.56 DLP: 833.11 Trnscrpt: 2020 
() [id number] ADOLFO Horton NAME: T[address] PHYS:
 [place] [address] : 1980 AGE: 39 SEX: F 
ACCT NO: [id number] LOC: B.ERS PHONE #: [phone] EXAM DATE: 2020 
STATUS: PRE ER FAX #: [phone] RAD #: D/C DT PAGE 2 Signed Report Patient 
Name: FAMILIA BAUER Unit No: [id number] EXAMS: CPT CODE: [id number] CT ABD 
PELVIS W/CONT [address]339  &lt;Continued&gt; Orig Print D/T: S: 2020 () ADOLFO Horton NAME: T[address]IN[address] PHYS: [place] [address] : 1980 AGE: 39 SEX: F ACCT NO: 
[id number] LOC: B.ERS PHONE #: [phone] EXAM DATE: 2020 STATUS: PRE 
ER FAX #: [phone]  RAD #: D/C DT PAGE 3 Signed ReportBASI METABOLIC PANEL
2020 23:10:00





             Test Item    Value        Reference Range Interpretation Comments

 

             SODIUM (test code = 134.0 mmol/L 133-144      N            



             NA)                                                 

 

             POTASSIUM (test code 3.8 mmol/L   3.5-5.1      N            



             = K)                                                

 

             CHLORIDE (test code 104 mmol/L          N            



             = CL)                                               

 

             CARBON DIOXIDE (test 25 mmol/L    21-32        N            



             code = CO2)                                         

 

             ANION GAP (test code 5.0 GAP calc 4.0-15.0     N            



             = GAP)                                              

 

             GLUCOSE (test code = 122 MG/DL           H            



             GLU)                                                

 

             BLOOD UREA NITROGEN 6 MG/DL      7-18         L            



             (test code = BUN)                                        

 

             CREATININE (test 0.80 MG/DL   0.55-1.30    N            Results may

 be



             code = CREAT)                                        depressed if



                                                                 patient is



                                                                 takingN-Acetylc

yste



                                                                 ine (NAC) and



                                                                 Metamizole



                                                                 (Dipyrone).

 

             CALCIUM (test code = 9.3 MG/DL    8.5-10.1     N            



             CA)                                                 

 

             INDEX HEMOLYSIS 1 NORMAL <10 MG 1 NORMAL                  



             (test code = Index/DL                               



             HEMINDEX)                                           

 

             INDEX ICTERIC (test 1 NORMAL <2 MG 1 NORMAL                  



             code = ICTINDEX) Index/DL                               

 

             INDEX LIPEMIA (test 1 NORMAL <50 MG 1 NORMAL                  



             code = LIPINDEX) Index/DL                               



[facility] [id number] 23:10:00





             Test Item    Value        Reference Range Interpretation Comments

 

             TOTAL PROTEIN (test code = PROT) 7.9 G/DL     6.4-8.2      N       

     

 

             ALBUMIN (test code = ALB) 3.3 G/DL     3.4-5.0      L            

 

             BILIRUBIN TOTAL (test code = BILT) 0.23 MG/DL   0.00-1.00    N     

       

 

             BILIRUBIN DIRECT (test code = 0.11 MG/DL   0.00-0.30    N          

  



             BILD)                                               

 

             BILIRUBIN INDIRECT (test code = 0.12 MG/DL   0.2-1.3      L        

    



             BILIND)                                             

 

             SGOT/AST (test code = AST) 20 Unit/L    15-37        N            

 

             SGPT/ALT (test code = ALT) 24 Unit/L    12-78        N            

 

             ALKALINE PHOSPHATASE TOTAL (test 87 Unit/L           N       

     



             code = ALKP)                                        



Specimen comments: [id number] 23:10:00





             Test Item    Value        Reference Range Interpretation Comments

 

             LIPASE (test code = LIP) 76 Unit/L    114-286      L            



Specimen comments: [facility] [id number] 23:10:00





             Test Item    Value        Reference Range Interpretation Comments

 

             HCG SERUM (test <1 mi-IU/ML  0-3          N            INTERPRET B-

HCG LEVELS



             code = HCG)                                         LESS THAN OR EQ

UAL TO 3



                                                                 MIU/ML AS NEG



Specimen comments: [id number] 23:10:00





             Test Item    Value        Reference Range Interpretation Comments

 

             TROPONIN-I   < 0.015 NG/ML 0.000-0.045  N            INTERPRET WITH

 CAUTION, THIS



             (test code =                                        VALUE EXCEEDS T

HE LOWER



             TROPI)                                              LIMITOF LINEARI

TY



                                                                 VERIFICATION ES

TABLISHED BY



                                                                 THE LABORATORY.

An elevated



                                                                 troponin value 

alone is not



                                                                 sufficient todi

agnose a



                                                                 myocardial infa

rction.



                                                                 Rather, the



                                                                 patient'sclinic

al



                                                                 presentation (h

istory,



                                                                 physical exam) 

and ECGshould



                                                                 be used in conj

unction with



                                                                 troponin in the

diagnostic



                                                                 evaluation of s

uspected



                                                                 myocardial infa

rction.



                                                                 Aserial samplin

g protocol is



                                                                 recommended to 

facilitate



                                                                 theidentificati

on of



                                                                 temporal change

s in troponin



                                                                 levelscharacter

istic of MI.



Specimen comments: ccBASIC METABOLIC [id number] 23:06:00





             Test Item    Value        Reference Range Interpretation Comments

 

             SODIUM (test code = 134.0 mmol/L 133-144      N            



             NA)                                                 

 

             POTASSIUM (test code 3.8 mmol/L   3.5-5.1      N            



             = K)                                                

 

             CHLORIDE (test code 104 mmol/L          N            



             = CL)                                               

 

             CARBON DIOXIDE (test 25 mmol/L    21-32        N            



             code = CO2)                                         

 

             ANION GAP (test code 5.0 GAP calc 4.0-15.0     N            



             = GAP)                                              

 

             GLUCOSE (test code = 122 MG/DL           H            



             GLU)                                                

 

             BLOOD UREA NITROGEN 6 MG/DL      7-18         L            



             (test code = BUN)                                        

 

             CREATININE (test 0.80 MG/DL   0.55-1.30    N            Results may

 be



             code = CREAT)                                        depressed if



                                                                 patient is



                                                                 takingN-Acetylc

yste



                                                                 ine (NAC) and



                                                                 Metamizole



                                                                 (Dipyrone).

 

             CALCIUM (test code = 9.3 MG/DL    8.5-10.1     N            



             CA)                                                 

 

             INDEX HEMOLYSIS 1 NORMAL <10 MG 1 NORMAL                  



             (test code = Index/DL                               



             HEMINDEX)                                           

 

             INDEX ICTERIC (test 1 NORMAL <2 MG 1 NORMAL                  



             code = ICTINDEX) Index/DL                               

 

             INDEX LIPEMIA (test 1 NORMAL <50 MG 1 NORMAL                  



             code = LIPINDEX) Index/DL                               



Spec[facility] [id number] 23:06:00





             Test Item    Value        Reference Range Interpretation Comments

 

             TOTAL PROTEIN (test code = PROT)  G/DL        6.4-8.2              

     

 

             ALBUMIN (test code = ALB) 3.3 G/DL     3.4-5.0      L            

 

             BILIRUBIN TOTAL (test code = BILT)  MG/DL       0.00-1.00          

       

 

             BILIRUBIN DIRECT (test code = 0.11 MG/DL   0.00-0.30    N          

  



             BILD)                                               

 

             BILIRUBIN INDIRECT (test code =  MG/DL       0.2-1.3               

    



             BILIND)                                             

 

             SGOT/AST (test code = AST) 20 Unit/L    15-37        N            

 

             SGPT/ALT (test code = ALT) 24 Unit/L    12-78        N            

 

             ALKALINE PHOSPHATASE TOTAL (test  Unit/L                     

     



             code = ALKP)                                        



Specimen comments: [id number] 23:06:00





             Test Item    Value        Reference Range Interpretation Comments

 

             LIPASE (test code = LIP) 76 Unit/L    114-286      L            



Specimen comments: ccHCG [id number] 23:06:00





             Test Item    Value        Reference Range Interpretation Comments

 

             HCG SERUM (test code = HCG)  mi-IU/ML    0-3                       



Specimen comments: [id number] 23:06:00





             Test Item    Value        Reference Range Interpretation Comments

 

             TROPONIN-I (test code = TROPI)  NG/ML       0.000-0.045            

   



Specimen comments: ccCBC W/O [id number] 22:51:00





             Test Item    Value        Reference Range Interpretation Comments

 

             WHITE BLOOD CELL (test code = WBC) 11.6 K/mm3   4.1-12.1     N     

       

 

             RED BLOOD CELL (test code = RBC) 5.13 M/mm3   3.8-5.5      N       

     

 

             HEMOGLOBIN (test code = HGB) 14.7 G/DL    10.6-15.8    N           

 

 

             HEMATOCRIT (test code = HCT) 45.2 %       31.8-47.4    N           

 

 

             MEAN CELL VOLUME (test code = MCV) 88.1 fL      80.1-101.1   N     

       

 

             MEAN CELL HGB (test code = MCH) 28.7 pg      25.3-35.3    N        

    

 

             MEAN CELL HGB CONCETRATION (test 32.5 G/DL    32.7-35.1    L       

     



             code = MCHC)                                        

 

             RED CELL DISTRIBUTION WIDTH (test 13.0 %       12.2-16.4    N      

      



             code = RDW)                                         

 

             PLATELET COUNT (test code = PLT) 270 K/mm3    155-337      N       

     

 

             MEAN PLATELET VOLUME (test code = 11.2 fL      6.8-11.2     N      

      



             MPV)                                                



Specimen comments: cc- XR CHEST [id number] 22:49:00 FAX: Tank Edmonds MD 
[phone] [place]: [address]: PRE FAX: Tisha Shaver [phone] --------
----------------------------------------------------------------------- Patient 
Name: FAMILIA BAUER No: [id number] EXAMS:  CPT CODE: [id number] XR CHEST 1 V
 [address] Location code: H5 Chest 1 view Indication: abd pain .  Comparison: 
2016 Findings: The heart and mediastinum are not remarkable. Costophrenic 
angles are clear. Lungs are clear.  Bone is unremarkable for age. Impression: 1.
 No radiographic evidence of acute cardiopulmonary disease. ** Electronically 
Signed by Evan Wu MD on 2020 at 224 ** Reported and signed by: 
Evan Wu MD CC: Tisha JAVIER  Dictated Date/Time: 2020 
(1673)Technologist: Vanessa Gregg  Transcribed 
Date/Time: 2020 (3414) By: [email or web address] Orig Print D/T: S: 2020 (8318)
 ADOLFO Horton NAME: SUNIL BAUER  [address] [place] [address] Bl PHYS: Tisha Jorge, [place] [address] : 1980 AGE: 39 SEX: F  ACCT NO: 
[id number] LOC: B.ERS PHONE #: [phone] EXAM DATE: 2020 STATUS: PRE 
ER FAX #: [phone] RAD NO: DC Dt: PAGE  1 Signed ReportDANNY COPPOLA, RIGHT
2020 07:23:00Reason for exam:-&gt;RIB INJURYReason for exam:-&gt;FALLFINAL
 REPORT PATIENT ID: [id number] CLINICAL HISTORY: RIB INJURYFALL TECHNIQUE: 1 view 
of the chest with 7 additional views of the right ribs. COMPARISON: None 
IMPRESSION: There is no evidence for a right rib fracture. The left ribs also 
appear intact. There are no infiltrates or effusions. The cardiomediastinal 
silhouette is within normal limits for size. Signed: Michael España MDReport 
Verified Date/Time: 2020 07:23:59 Reading Location: [facility] 
Radiology Reading Room Electronically signedby: MICHAEL ESPAÑA M.D. on 
2020 07:23 AMPREGNANCY SCREEN, [id number] 05:44:00





             Test Item    Value        Reference Range Interpretation Comments

 

             PREGNANCY TEST URINE (BEAKER) (test Negative                       

        



             code = 583)                                         



DRUGS OF ABUSE [id number] 07:10:00





             Test Item    Value        Reference Range Interpretation Comments

 

             TRICYCLICS QL SQN (test POSITIVE     NEG          A            TEST

 PERFORMED



             code = TRIUR)                                        MANUALLY USING

 [employer]



                                                                 RAPIDTEST TCA.C

UTOFF



                                                                 >/= 1000 NG/ML 

A



                                                                 Positive drug s

creen



                                                                 result provides

 only



                                                                 a



                                                                 "PreliminaryPos

itive"



                                                                 test result.If 

a



                                                                 confirmation of



                                                                 positive result

 is



                                                                 necessary, a



                                                                 morespecific



                                                                 confirmatory te

st



                                                                 must be ordered

 by



                                                                 the physician. 

Drug



                                                                 screens are per

formed



                                                                 for medical (i.

e.



                                                                 treatment)purpo

ses



                                                                 only. Unconfirm

ed



                                                                 screening resul

ts



                                                                 must not beused

 for



                                                                 non-medical pur

poses



                                                                 (e.g employment



                                                                 testing).

 

             UR COCAINE (test code = NEGATIVE     NEGATIVE                  CUTO

FF >/= 300 NG/ML



             COCAU)                                              

 

             UR THC CANABINOIDS QL POSITIVE     NEGATIVE     A            CUTOFF

 >/= 20 NG/ML



             SQN (test code = CANU)                                        

 

             UR AMPHETAMINE QL SQN NEGATIVE     NEGATIVE                  CUTOFF

 >/= 500 NG/ML



             (test code = AMPHU)                                        

 

             UR BARBITURATE QUAL NEGATIVE     NEGATIVE                  CUTOFF >

/= 200 NG/ML



             (test code = BARBQLU)                                        

 

             UR BENZODIAZEPINE (test NEGATIVE     NEGATIVE                  CUTO

FF >/= 200 NG/ML



             code = BENZU)                                        

 

             UR OPIATES QUAL (test NEGATIVE     NEGATIVE                  CUTOFF

 >/= 300 NG/ML



             code = OPIAQLU)                                        

 

             UR PHENCYCLIDINE (PCP) NEGATIVE     NEGATIVE                  CUTOF

F >/= 25 NG/ML



             (test code = PHENCU)                                        



DRUGS OF ABUSE [id number] 07:04:00





             Test Item    Value        Reference Range Interpretation Comments

 

             TRICYCLICS QL SQN (test              NEG                       



             code = TRIUR)                                        

 

             UR COCAINE (test code = NEGATIVE     NEGATIVE                  CUTO

FF >/= 300



             COCAU)                                              NG/ML

 

             UR THC CANABINOIDS QL SQN POSITIVE     NEGATIVE     A            CU

TOFF >/= 20 NG/ML



             (test code = CANU)                                        

 

             UR AMPHETAMINE QL SQN NEGATIVE     NEGATIVE                  CUTOFF

 >/= 500



             (test code = AMPHU)                                        NG/ML

 

             UR BARBITURATE QUAL (test NEGATIVE     NEGATIVE                  CU

TOFF >/= 200



             code = BARBQLU)                                        NG/ML

 

             UR BENZODIAZEPINE (test NEGATIVE     NEGATIVE                  CUTO

FF >/= 200



             code = BENZU)                                        NG/ML

 

             UR OPIATES QUAL (test code NEGATIVE     NEGATIVE                  C

UTOFF >/= 300



             = OPIAQLU)                                          NG/ML

 

             UR PHENCYCLIDINE (PCP) NEGATIVE     NEGATIVE                  CUTOF

F >/= 25 NG/ML



             (test code = PHENCU)                                        



BASIC METABOLIC [id number] 06:52:00





             Test Item    Value        Reference Range Interpretation Comments

 

             SODIUM (test code = 139 mmol/L   137-145      N            



             NA)                                                 

 

             POTASSIUM (test code 3.2 mmol/L   3.4-5.0      L            



             = K)                                                

 

             CHLORIDE (test code = 103 mmol/L          N            



             CL)                                                 

 

             CARBON DIOXIDE (test 23 mmol/L    22-30        N            



             code = CO2)                                         

 

             GLUCOSE (test code = 125 mg/dL           H            



             GLU)                                                

 

             BLOOD UREA NITROGEN 3 mg/dL      7-17         L            



             (test code = BUN)                                        

 

             GLOMERULAR FILTRATION 100          >60                       The es

timated



             RATE (test code =                                        glomerular

 filtration



             GFR)                                                rate is compute

d



                                                                 usingpatient ra

ce, age



                                                                 (>18), sex, and

 serum



                                                                 creatinine. If 

anyof



                                                                 the needed data



                                                                 elements are mi

ssing



                                                                 the Laboratory 

cannot



                                                                 compute an saige

mation



                                                                 of the glomerul

ar



                                                                 filtration rate

.

 

             CREATININE (test code 0.7 mg/dL    0.5-1.0      N            



             = CREAT)                                            

 

             CALCIUM (test code = 9.5 mg/dL    8.4-10.2     N            



             CA)                                                 



LIVER FUNCTION [id number] 06:52:00





             Test Item    Value        Reference Range Interpretation Comments

 

             TOTAL PROTEIN (test 7.3 g/dL     6.3-8.2      N            



             code = PROT)                                        

 

             ALBUMIN (test code = 4.0 g/dL     3.5-5.0      N            



             ALB)                                                

 

             BILIRUBIN TOTAL (test 0.2 mg/dL    0.2-1.3      N            



             code = BILT)                                        

 

             BILIRUBIN CONJUGATED 0 mg/dL      0-0.3        N            ~~~~~~~

~~~~~~~~~~~~~~



             (test code = BILCON)                                        ~~~~~~~

~~~~~~~~~~~~~~



                                                                 ~~~~~~~~~~~~~~~

~~~CON



                                                                 JUGATED BILIRUB

IN IS



                                                                 THE REPLACEMENT

 ASSAY



                                                                 FOR



                                                                 DIRECTBILIRUBIN

.~~~~~



                                                                 ~~~~~~~~~~~~~~~

~~~~~~



                                                                 ~~~~~~~~~~~~~~~

~~~~~~



                                                                 ~~~~~~~~~~~~~

 

             BILIRUBIN UNCONJUGATED 0.1 mg/dL    0-1.1        N            



             (test code = BILUNC)                                        

 

             SGOT/AST (test code = 41 U/L       15-46        N            



             AST)                                                

 

             SGPT/ALT (test code = 29 U/L       13-69        N            



             ALT)                                                

 

             ALKALINE PHOSPHATASE 79 U/L              N            



             (test code = ALKP)                                        



[id number] 06:52:00





             Test Item    Value        Reference Range Interpretation Comments

 

             LIPASE (test code = LIP) 88 U/L              N            



[id number] 06:52:00





             Test Item    Value        Reference Range Interpretation Comments

 

             ALCOHOL (test code = < 10 mg/dL   <10                        ~~~~~~

~~~~~~~~~~~~~~~



             ALC)                                                ~~~~~~~~~~~~~~~

~~~~~~~



                                                                 ~~~~~~~ RESULTS

 ARE TO



                                                                 BE USED FOR MED

ICAL



                                                                 PURPOSES ONLY.F

OR



                                                                 LEGAL PURPOSES 

THE



                                                                 SPECIMEN MUST B

E



                                                                 COLLECTED BY A 

CHAINOF



                                                                 CUSTODY. LEGAL 

TESTING



                                                                 IS NOT PERFORME

D BY



                                                                 THIS FACILITY.



                                                                 ~~~~~~~~~~~~~~~

~~~~~~~



                                                                 ~~~~~~~~~~~~~~~

~~~~~~~



                                                                 ~~~~~~



UA RFLX MICR CULT IF [id number] 06:48:00





             Test Item    Value        Reference Range Interpretation Comments

 

             UA COLOR (test code = Yellow       Yellow                    



             COLU)                                               

 

             UA APPEARANCE (test Clear        Clear                     



             code = APPU)                                        

 

             UA GLUCOSE DIPSTICK Negative     Negative                  



             (test code = DGLUU)                                        

 

             UA BILIRUBIN DIPSTICK Negative     Negative                  



             (test code = BILU)                                        

 

             UA KETONE DIPSTICK Negative mg/dL Negative                  



             (test code = KETU)                                        

 

             UA SPECIFIC GRAVITY 1.003        <1.030                    



             (test code = SGU)                                        

 

             UA BLOOD DIPSTICK Negative     Negative                  



             (test code = FREDDIE)                                        

 

             UA PH DIPSTICK (test 6.0          5.0-8.0                   



             code = CARLEEN)                                         

 

             UA PROTEIN DIPSTICK NEGATIVE mg/dL Negative                  



             (test code = PROU)                                        

 

             UA UROBILINOGEN Negative mg/dL Negative                  



             DIPSTICK (test code =                                        



             URO)                                                

 

             UA NITRITE DIPSTICK Negative     Negative                  



             (test code = PAUL)                                        

 

             UA LEUKOCYTE ESTERASE NEGATIVE     Negative                  



             DIPSTICK (test code =                                        



             LEUU)                                               

 

             UA WBC (test code = 0-3 /HPF     <4-5                      <10 WBC/

HPF =



             WBCUR)                                              PYURIA ABSENT



                                                                 URINE CULTURE N

OT



                                                                 INDICATED

 

             UA RBC (test code = 0-3 /HPF     <4-5                      



             RBCU)                                               

 

             UA BACTERIA (test 1+ /HPF      None-Rare    A            



             code = BACU)                                        

 

             UA SQUAMOUS CELLS 0-5 (RARE) /HPF 0-5 (RARE)                



             (test code = SQU)                                        

 

             UA MUCUS (test code = Rare /LPF    <Rare        A            



             MUCU)                                               



SOURCE OF URINE: STRAIGHT CATHETERIndication for culture: Dysuria/FrequencyUR 
HCG [id number] 06:48:00





             Test Item    Value        Reference Range Interpretation Comments

 

             UR HCG QUAL (test code = HCGQLU) NEGATIVE     NEGATIVE             

     



SOURCE OF URINE: STRAIGHT CATHETERIndication for culture: Dysuria/FrequencyUA 
RFLX MICR CULT IF [id number] 06:46:00





             Test Item    Value        Reference Range Interpretation Comments

 

             UA COLOR (test code = COLU)              YELLOW                    

 

             UA APPEARANCE (test code = APPU)              CLEAR                

     

 

             UA GLUCOSE DIPSTICK (test code =  MG/DL       NEGATIVE             

     



             DGLUU)                                              

 

             UA BILIRUBIN DIPSTICK (test code =              NEGATIVE           

       



             BILU)                                               

 

             UA KETONE DIPSTICK (test code = KETU)  MG/DL       NEGATIVE        

          

 

             UA SPECIFIC GRAVITY (test code = SGU)              1.000-1.030     

          

 

             UA BLOOD DIPSTICK (test code = FREDDIE)              NEGATIVE          

        

 

             UA PH DIPSTICK (test code = CARLEEN)              4.5-8.5              

     

 

             UA PROTEIN DIPSTICK (test code = PROU)  MG/DL       NEGATIVE       

           

 

             UA UROBILINOGEN DIPSTICK (test code =  EU/dL       <=1.0           

          



             URO)                                                

 

             UA NITRITE DIPSTICK (test code = PAUL)              NEGATIVE       

           

 

             UA LEUKOCYTE ESTERASE DIPSTICK (test              NEGATIVE         

         



             code = LEUU)                                        

 

             UA WBC (test code = WBCUR)  /HPF        0-3                       

 

             UA RBC (test code = RBCU)  /HPF        0-3                       

 

             UA BACTERIA (test code = BACU)  /HPF        NEGATIVE               

   



SOURCE OF URINE: STRAIGHT CATHETERIndication for culture: Dysuria/FrequencyUR 
HCG [id number] 06:46:00





             Test Item    Value        Reference Range Interpretation Comments

 

             UR HCG QUAL (test code = HCGQLU) NEGATIVE     NEGATIVE             

     



SOURCE OF URINE: STRAIGHT CATHETERIndication for culture: Dysuria/FrequencyCBC 
W/AUTO [id number] 06:36:00





             Test Item    Value        Reference Range Interpretation Comments

 

             WHITE BLOOD CELL (test code = 11.2 x10 3/uL 5.0-12.0     N         

   



             WBC)                                                

 

             RED BLOOD CELL (test code = 4.87 x10 6/uL 4.20-5.40    N           

 



             RBC)                                                

 

             HEMOGLOBIN (test code = HGB) 14.0 g/dL    12.0-16.0    N           

 

 

             HEMATOCRIT (test code = HCT) 43.1 %       36.0-46.0    N           

 

 

             MEAN CELL VOLUME (test code = 89 fL        81-99        N          

  



             MCV)                                                

 

             MEAN CELL HGB (test code = MCH) 28.7 pg      27-31        N        

    

 

             MEAN CELL HGB CONCENTRATION 32.5 g/dL    33-37        L            



             (test code = MCHC)                                        

 

             RED CELL DISTRIBUTION WIDTH 13.0 %       11.5-15.5    N            



             (test code = RDW)                                        

 

             PLATELET COUNT (test code = 210 x10 3/uL 130-400      N            



             PLT)                                                

 

             MEAN PLATELET VOLUME (test code 11.7 fL      9.4-16.4     N        

    



             = MPV)                                              

 

             NEUTROPHIL % (test code = NT%) 54.0 %       43-65        N         

   

 

             IMMATURE GRANULOCYTE % (test 0.4 %        0.0-2.0      N           

 



             code = IG%)                                         

 

             LYMPHOCYTE % (test code = LY%) 35.1 %       20.5-45.5    N         

   

 

             MONOCYTE % (test code = MO%) 8.0 %        5.5-11.7     N           

 

 

             EOSINOPHIL % (test code = EO%) 2.0 %        0.9-2.9      N         

   

 

             BASOPHIL % (test code = BA%) 0.5 %        0.2-1.0      N           

 

 

             NUCLEATED RBC % (test code = 0.0 %        0-1.0        N           

 



             NRBC%)                                              

 

             NEUTROPHIL # (test code = NT#) 6.07 x10 3/uL 2.2-4.8      H        

    

 

             IMMATURE GRANULOCYTE # (test 0.04 x10 3/uL 0-0.03       H          

  



             code = IG#)                                         

 

             LYMPHOCYTE # (test code = LY#) 3.95 x10 3/uL 1.3-2.9      H        

    

 

             MONOCYTE # (test code = MO#) 0.90 x10 3/uL 0.3-0.8      H          

  

 

             EOSINOPHIL # (test code = EO#) 0.22 x10 3/uL 0.0-0.2      H        

    

 

             BASOPHIL # (test code = BA#) 0.06 x10 3/uL 0.0-0.1      N